# Patient Record
Sex: FEMALE | Race: OTHER | NOT HISPANIC OR LATINO | URBAN - METROPOLITAN AREA
[De-identification: names, ages, dates, MRNs, and addresses within clinical notes are randomized per-mention and may not be internally consistent; named-entity substitution may affect disease eponyms.]

---

## 2017-07-14 ENCOUNTER — EMERGENCY (EMERGENCY)
Facility: HOSPITAL | Age: 27
LOS: 1 days | Discharge: HOME | End: 2017-07-14
Attending: EMERGENCY MEDICINE | Admitting: EMERGENCY MEDICINE
Payer: COMMERCIAL

## 2017-07-14 VITALS
TEMPERATURE: 98 F | DIASTOLIC BLOOD PRESSURE: 73 MMHG | OXYGEN SATURATION: 100 % | HEIGHT: 62 IN | HEART RATE: 77 BPM | RESPIRATION RATE: 15 BRPM | WEIGHT: 149.91 LBS | SYSTOLIC BLOOD PRESSURE: 130 MMHG

## 2017-07-14 DIAGNOSIS — Z98.89 OTHER SPECIFIED POSTPROCEDURAL STATES: Chronic | ICD-10-CM

## 2017-07-14 PROCEDURE — 99053 MED SERV 10PM-8AM 24 HR FAC: CPT

## 2017-07-14 PROCEDURE — 71020: CPT | Mod: 26

## 2017-07-14 PROCEDURE — 99285 EMERGENCY DEPT VISIT HI MDM: CPT | Mod: 25

## 2017-07-14 PROCEDURE — 93010 ELECTROCARDIOGRAM REPORT: CPT

## 2017-07-14 RX ORDER — ACETAMINOPHEN 500 MG
650 TABLET ORAL ONCE
Qty: 0 | Refills: 0 | Status: COMPLETED | OUTPATIENT
Start: 2017-07-14 | End: 2017-07-14

## 2017-07-14 RX ADMIN — Medication 650 MILLIGRAM(S): at 07:53

## 2017-07-14 NOTE — ED ADULT NURSE NOTE - CHIEF COMPLAINT QUOTE
Pt c/o generalized chest pain and tightness [id number]. Denies dizziness, lightheadedness, SOB, n/v or any PMH. Appears comfortable in triage

## 2017-07-14 NOTE — ED ADULT TRIAGE NOTE - CHIEF COMPLAINT QUOTE
Pt c/o generalized chest pain and tightness [id number]. Denies dizziness, lightheadedness, SOB, n/v or any PMH. Appears comfortable in triage

## 2017-07-14 NOTE — ED PROVIDER NOTE - OBJECTIVE STATEMENT
27F no PMH p/w L CP, since 0530, began while crying (was upset with someone), intermittent, non-radiating, no exacerbating factors, improved w/ deep breaths, not similar to prior. Also c/o chronic intermittent occipital pain that occurs while laughing or crying, lasts seconds and then resolves, currently not feeling it.  No associated lightheaded, diaphoresis, SOB, NVD, abd pain, urinary complaints, OCP use, LE pain/swelling, recent travel/immobilization, black/bloody stool. Dad MI 40s, no FMH clots. Not pleuritic.

## 2017-07-14 NOTE — ED PROVIDER NOTE - MEDICAL DECISION MAKING DETAILS
27F no PM p/w intermittent cp for several hrs, no other associated cardiovascular complaints. Vitals and exam wnl. EKG wnl.   ddx: Likely MSK vs. GI. Clinically not ACS, PE, tamponade, perf, myocarditis, dissection.  CXR, symptom control, reassess.   Likely outpt pmd f/u.

## 2017-07-14 NOTE — ED ADULT NURSE NOTE - CHPI ED SYMPTOMS NEG
no back pain/no cough/no vomiting/no nausea/no fever/no chills/no diaphoresis/no syncope/no shortness of breath/no dizziness

## 2017-07-14 NOTE — ED ADULT NURSE NOTE - OBJECTIVE STATEMENT
Pt 27y F presents to ED c/o non-radiating CP that started around 530am after being upset(crying). Pt AAOx3 and ambulatory, No significant PMH. Pt states that pain improved with deep breathes. Pt denies N/V/D, HA, SOB, diaphoresis, lightheadedness, weakness, tingling, dysuria. Will continue to monitor.

## 2017-11-18 ENCOUNTER — EMERGENCY (EMERGENCY)
Facility: HOSPITAL | Age: 27
LOS: 1 days | Discharge: HOME | End: 2017-11-18
Attending: EMERGENCY MEDICINE | Admitting: EMERGENCY MEDICINE
Payer: COMMERCIAL

## 2017-11-18 VITALS
RESPIRATION RATE: 16 BRPM | DIASTOLIC BLOOD PRESSURE: 99 MMHG | HEART RATE: 76 BPM | OXYGEN SATURATION: 99 % | TEMPERATURE: 98 F | SYSTOLIC BLOOD PRESSURE: 122 MMHG

## 2017-11-18 DIAGNOSIS — Z98.89 OTHER SPECIFIED POSTPROCEDURAL STATES: Chronic | ICD-10-CM

## 2017-11-18 LAB
ALBUMIN SERPL ELPH-MCNC: 4.4 G/DL — SIGNIFICANT CHANGE UP (ref 3.3–5)
ALP SERPL-CCNC: 33 U/L — LOW (ref 40–120)
ALT FLD-CCNC: 25 U/L — SIGNIFICANT CHANGE UP (ref 4–33)
APTT BLD: 29.5 SEC — SIGNIFICANT CHANGE UP (ref 27.5–37.4)
AST SERPL-CCNC: 15 U/L — SIGNIFICANT CHANGE UP (ref 4–32)
BASOPHILS # BLD AUTO: 0.08 K/UL — SIGNIFICANT CHANGE UP (ref 0–0.2)
BASOPHILS NFR BLD AUTO: 0.7 % — SIGNIFICANT CHANGE UP (ref 0–2)
BILIRUB SERPL-MCNC: 0.2 MG/DL — SIGNIFICANT CHANGE UP (ref 0.2–1.2)
BUN SERPL-MCNC: 18 MG/DL — SIGNIFICANT CHANGE UP (ref 7–23)
CALCIUM SERPL-MCNC: 8.9 MG/DL — SIGNIFICANT CHANGE UP (ref 8.4–10.5)
CHLORIDE SERPL-SCNC: 101 MMOL/L — SIGNIFICANT CHANGE UP (ref 98–107)
CO2 SERPL-SCNC: 22 MMOL/L — SIGNIFICANT CHANGE UP (ref 22–31)
CREAT SERPL-MCNC: 0.71 MG/DL — SIGNIFICANT CHANGE UP (ref 0.5–1.3)
EOSINOPHIL # BLD AUTO: 0.18 K/UL — SIGNIFICANT CHANGE UP (ref 0–0.5)
EOSINOPHIL NFR BLD AUTO: 1.6 % — SIGNIFICANT CHANGE UP (ref 0–6)
GLUCOSE SERPL-MCNC: 102 MG/DL — HIGH (ref 70–99)
HCG SERPL-ACNC: < 5 MIU/ML — SIGNIFICANT CHANGE UP
HCT VFR BLD CALC: 41.6 % — SIGNIFICANT CHANGE UP (ref 34.5–45)
HGB BLD-MCNC: 14 G/DL — SIGNIFICANT CHANGE UP (ref 11.5–15.5)
IMM GRANULOCYTES # BLD AUTO: 0.05 # — SIGNIFICANT CHANGE UP
IMM GRANULOCYTES NFR BLD AUTO: 0.4 % — SIGNIFICANT CHANGE UP (ref 0–1.5)
INR BLD: 1.02 — SIGNIFICANT CHANGE UP (ref 0.88–1.17)
LYMPHOCYTES # BLD AUTO: 27 % — SIGNIFICANT CHANGE UP (ref 13–44)
LYMPHOCYTES # BLD AUTO: 3.13 K/UL — SIGNIFICANT CHANGE UP (ref 1–3.3)
MCHC RBC-ENTMCNC: 29.7 PG — SIGNIFICANT CHANGE UP (ref 27–34)
MCHC RBC-ENTMCNC: 33.7 % — SIGNIFICANT CHANGE UP (ref 32–36)
MCV RBC AUTO: 88.1 FL — SIGNIFICANT CHANGE UP (ref 80–100)
MONOCYTES # BLD AUTO: 0.57 K/UL — SIGNIFICANT CHANGE UP (ref 0–0.9)
MONOCYTES NFR BLD AUTO: 4.9 % — SIGNIFICANT CHANGE UP (ref 2–14)
NEUTROPHILS # BLD AUTO: 7.59 K/UL — HIGH (ref 1.8–7.4)
NEUTROPHILS NFR BLD AUTO: 65.4 % — SIGNIFICANT CHANGE UP (ref 43–77)
NRBC # FLD: 0 — SIGNIFICANT CHANGE UP
PLATELET # BLD AUTO: 255 K/UL — SIGNIFICANT CHANGE UP (ref 150–400)
PMV BLD: 10.3 FL — SIGNIFICANT CHANGE UP (ref 7–13)
POTASSIUM SERPL-MCNC: 4.1 MMOL/L — SIGNIFICANT CHANGE UP (ref 3.5–5.3)
POTASSIUM SERPL-SCNC: 4.1 MMOL/L — SIGNIFICANT CHANGE UP (ref 3.5–5.3)
PROT SERPL-MCNC: 6.9 G/DL — SIGNIFICANT CHANGE UP (ref 6–8.3)
PROTHROM AB SERPL-ACNC: 11.4 SEC — SIGNIFICANT CHANGE UP (ref 9.8–13.1)
RBC # BLD: 4.72 M/UL — SIGNIFICANT CHANGE UP (ref 3.8–5.2)
RBC # FLD: 12.6 % — SIGNIFICANT CHANGE UP (ref 10.3–14.5)
SODIUM SERPL-SCNC: 140 MMOL/L — SIGNIFICANT CHANGE UP (ref 135–145)
WBC # BLD: 11.6 K/UL — HIGH (ref 3.8–10.5)
WBC # FLD AUTO: 11.6 K/UL — HIGH (ref 3.8–10.5)

## 2017-11-18 PROCEDURE — 71020: CPT | Mod: 26

## 2017-11-18 PROCEDURE — 99285 EMERGENCY DEPT VISIT HI MDM: CPT | Mod: 25

## 2017-11-18 PROCEDURE — 70450 CT HEAD/BRAIN W/O DYE: CPT | Mod: 26

## 2017-11-18 RX ORDER — METOCLOPRAMIDE HCL 10 MG
10 TABLET ORAL ONCE
Qty: 0 | Refills: 0 | Status: COMPLETED | OUTPATIENT
Start: 2017-11-18 | End: 2017-11-18

## 2017-11-18 RX ORDER — SODIUM CHLORIDE 9 MG/ML
1000 INJECTION INTRAMUSCULAR; INTRAVENOUS; SUBCUTANEOUS ONCE
Qty: 0 | Refills: 0 | Status: COMPLETED | OUTPATIENT
Start: 2017-11-18 | End: 2017-11-18

## 2017-11-18 RX ORDER — KETOROLAC TROMETHAMINE 30 MG/ML
15 SYRINGE (ML) INJECTION ONCE
Qty: 0 | Refills: 0 | Status: DISCONTINUED | OUTPATIENT
Start: 2017-11-18 | End: 2017-11-18

## 2017-11-18 RX ADMIN — Medication 15 MILLIGRAM(S): at 07:09

## 2017-11-18 RX ADMIN — Medication 10 MILLIGRAM(S): at 06:21

## 2017-11-18 RX ADMIN — SODIUM CHLORIDE 1000 MILLILITER(S): 9 INJECTION INTRAMUSCULAR; INTRAVENOUS; SUBCUTANEOUS at 05:15

## 2017-11-18 NOTE — ED ADULT NURSE NOTE - OBJECTIVE STATEMENT
pt arrives aaox3 w/ c/o syncopal episode earlier tonight. pt states she passed out not sure where she hit her head but states she woke up to her  trying to wake her. pt c/o severe left sided headache with sensitivity to light. pt states happened before years ago 3 times but never was worked up for it. pt IV accessed 20 gauge Left hand labs sent. waiting further orders will continue to monitor.

## 2017-11-18 NOTE — ED PROVIDER NOTE - PROGRESS NOTE DETAILS
Luis ROGER: Pt offered CDU for tele monitoring and echo, she refuses to stay, stating that she prefers to go home now and will follow-up with her PMD.

## 2017-11-18 NOTE — ED ADULT NURSE NOTE - CHIEF COMPLAINT QUOTE
Patient brought into walk in triage by her , he states that he found her on the floor passed out with incontinence of urine. She states that she has pain on the left side of her head, no laceration noted.  As per her  they were in the kitchen cooking , he went out for a cigarette and came back in less that 3 minutes and she was on the floor.  H/o passing out in the past but is not sure if it was a seizure.

## 2017-11-18 NOTE — ED PROVIDER NOTE - OBJECTIVE STATEMENT
27F no medical hx presents with syncope. Pt endorsed to having 3 episodes of syncope 3  years ago without a further workup. Pt came in today after a  found her on the floor. It was an unwitnessed fall with urinary incontinence. No post ictal state. No tongue injury. Pt denies any chest pain or SOB. Complaining of headache and head pressure since the fall. No FHX of heart disease. No one dies young in the family.

## 2017-11-18 NOTE — ED ADULT TRIAGE NOTE - CHIEF COMPLAINT QUOTE
Patient brought into walk in triage by her , he states that he found her on the floor passed out. She states that she has pain on the left side of her head, no laceration noted.  As per her  they were in the kitchen cooking , he went out for a cigarette and came back in less that 3 minutes and she was on the floor. Patient brought into walk in triage by her , he states that he found her on the floor passed out with incontinence of urine. She states that she has pain on the left side of her head, no laceration noted.  As per her  they were in the kitchen cooking , he went out for a cigarette and came back in less that 3 minutes and she was on the floor.  H/o passing out in the past but is not sure if it was a seizure.

## 2017-11-18 NOTE — ED PROVIDER NOTE - ATTENDING CONTRIBUTION TO CARE
28 y/o F with no PMH here after a syncopal episode.  Pt states she was cooking in her kitchen, started to feel a mild headache about 30 min before.  Pt also reports not eating or drinking during the day as she had been busy running errands.  Pt does not recall anything except for waking up on the floor.  Per , he left the kitchen to have a cigarette for a minute and returned to find the pt on the floor.  (+)urinary incontinence, but no tongue biting, no seizure like activity.  Pt awoke afterwards and immediately back to baseline with no apparent post-ictal period.  She reports having 3 previous similar episodes of syncope in the past, but had never been seen by a doctor any of those times.  Currently pt c/o headache, worse in the back where she struck her head on the floor and mild nausea.  Well appearing, lying comfortably in stretcher, awake and alert, nontoxic.  VSS.  NCAT with small contusion to occiput, neck is supple, PERRL, EOMI, dry mucous membranes.  Lungs cta bl.  Cards nl S1/S2, RRR, no MRG.  Abd soft ntnd.  No pedal edema or calf tenderness.  Extremities intact with no gross deformities, moving all well.  No focal neuro deficits.  Plan for labs, ekg, ct head r/o injury, cxr, poss obs for syncope eval.

## 2018-01-26 ENCOUNTER — EMERGENCY (EMERGENCY)
Facility: HOSPITAL | Age: 28
LOS: 1 days | Discharge: LEFT BEFORE DISCHARGE | End: 2018-01-26
Admitting: EMERGENCY MEDICINE

## 2018-01-26 VITALS
SYSTOLIC BLOOD PRESSURE: 103 MMHG | RESPIRATION RATE: 16 BRPM | OXYGEN SATURATION: 100 % | DIASTOLIC BLOOD PRESSURE: 66 MMHG | TEMPERATURE: 99 F | HEART RATE: 82 BPM

## 2018-01-26 DIAGNOSIS — Z98.89 OTHER SPECIFIED POSTPROCEDURAL STATES: Chronic | ICD-10-CM

## 2018-01-26 NOTE — ED ADULT TRIAGE NOTE - CHIEF COMPLAINT QUOTE
Pt c/o mid sternal CP that radiates to back x 1hr. Also c/o SOB, dizziness, nausea, intermit pain to BUE. States hx of similar eps in past unable to see cardiologist.

## 2018-01-27 VITALS
HEART RATE: 71 BPM | DIASTOLIC BLOOD PRESSURE: 62 MMHG | SYSTOLIC BLOOD PRESSURE: 105 MMHG | RESPIRATION RATE: 16 BRPM | OXYGEN SATURATION: 100 % | TEMPERATURE: 98 F

## 2018-02-05 ENCOUNTER — EMERGENCY (EMERGENCY)
Facility: HOSPITAL | Age: 28
LOS: 1 days | Discharge: HOME | End: 2018-02-05
Attending: EMERGENCY MEDICINE | Admitting: EMERGENCY MEDICINE
Payer: COMMERCIAL

## 2018-02-05 VITALS
TEMPERATURE: 98 F | HEART RATE: 128 BPM | DIASTOLIC BLOOD PRESSURE: 100 MMHG | RESPIRATION RATE: 20 BRPM | OXYGEN SATURATION: 99 % | SYSTOLIC BLOOD PRESSURE: 143 MMHG | WEIGHT: 149.91 LBS | HEIGHT: 62 IN

## 2018-02-05 VITALS
TEMPERATURE: 98 F | SYSTOLIC BLOOD PRESSURE: 119 MMHG | DIASTOLIC BLOOD PRESSURE: 80 MMHG | RESPIRATION RATE: 17 BRPM | OXYGEN SATURATION: 99 % | HEART RATE: 79 BPM

## 2018-02-05 DIAGNOSIS — Z98.89 OTHER SPECIFIED POSTPROCEDURAL STATES: Chronic | ICD-10-CM

## 2018-02-05 LAB
ALBUMIN SERPL ELPH-MCNC: 4.5 G/DL — SIGNIFICANT CHANGE UP (ref 3.3–5)
ALP SERPL-CCNC: 38 U/L — LOW (ref 40–120)
ALT FLD-CCNC: 36 U/L RC — SIGNIFICANT CHANGE UP (ref 10–45)
ANION GAP SERPL CALC-SCNC: 17 MMOL/L — SIGNIFICANT CHANGE UP (ref 5–17)
APPEARANCE UR: CLEAR — SIGNIFICANT CHANGE UP
AST SERPL-CCNC: 31 U/L — SIGNIFICANT CHANGE UP (ref 10–40)
BASOPHILS # BLD AUTO: 0.1 K/UL — SIGNIFICANT CHANGE UP (ref 0–0.2)
BASOPHILS NFR BLD AUTO: 1.1 % — SIGNIFICANT CHANGE UP (ref 0–2)
BILIRUB SERPL-MCNC: 0.1 MG/DL — LOW (ref 0.2–1.2)
BILIRUB UR-MCNC: NEGATIVE — SIGNIFICANT CHANGE UP
BUN SERPL-MCNC: 14 MG/DL — SIGNIFICANT CHANGE UP (ref 7–23)
CALCIUM SERPL-MCNC: 9 MG/DL — SIGNIFICANT CHANGE UP (ref 8.4–10.5)
CHLORIDE SERPL-SCNC: 101 MMOL/L — SIGNIFICANT CHANGE UP (ref 96–108)
CO2 SERPL-SCNC: 20 MMOL/L — LOW (ref 22–31)
COLOR SPEC: YELLOW — SIGNIFICANT CHANGE UP
CREAT SERPL-MCNC: 0.6 MG/DL — SIGNIFICANT CHANGE UP (ref 0.5–1.3)
DIFF PNL FLD: NEGATIVE — SIGNIFICANT CHANGE UP
EOSINOPHIL # BLD AUTO: 0.2 K/UL — SIGNIFICANT CHANGE UP (ref 0–0.5)
EOSINOPHIL NFR BLD AUTO: 1.9 % — SIGNIFICANT CHANGE UP (ref 0–6)
EPI CELLS # UR: SIGNIFICANT CHANGE UP /HPF
GLUCOSE SERPL-MCNC: 117 MG/DL — HIGH (ref 70–99)
GLUCOSE UR QL: NEGATIVE — SIGNIFICANT CHANGE UP
HCG SERPL QL: NEGATIVE — SIGNIFICANT CHANGE UP
HCT VFR BLD CALC: 42.3 % — SIGNIFICANT CHANGE UP (ref 34.5–45)
HGB BLD-MCNC: 15.2 G/DL — SIGNIFICANT CHANGE UP (ref 11.5–15.5)
KETONES UR-MCNC: NEGATIVE — SIGNIFICANT CHANGE UP
LEUKOCYTE ESTERASE UR-ACNC: NEGATIVE — SIGNIFICANT CHANGE UP
LYMPHOCYTES # BLD AUTO: 3.5 K/UL — HIGH (ref 1–3.3)
LYMPHOCYTES # BLD AUTO: 32.1 % — SIGNIFICANT CHANGE UP (ref 13–44)
MCHC RBC-ENTMCNC: 32.4 PG — SIGNIFICANT CHANGE UP (ref 27–34)
MCHC RBC-ENTMCNC: 35.8 GM/DL — SIGNIFICANT CHANGE UP (ref 32–36)
MCV RBC AUTO: 90.4 FL — SIGNIFICANT CHANGE UP (ref 80–100)
MONOCYTES # BLD AUTO: 0.6 K/UL — SIGNIFICANT CHANGE UP (ref 0–0.9)
MONOCYTES NFR BLD AUTO: 5.4 % — SIGNIFICANT CHANGE UP (ref 2–14)
NEUTROPHILS # BLD AUTO: 6.4 K/UL — SIGNIFICANT CHANGE UP (ref 1.8–7.4)
NEUTROPHILS NFR BLD AUTO: 59.5 % — SIGNIFICANT CHANGE UP (ref 43–77)
NITRITE UR-MCNC: NEGATIVE — SIGNIFICANT CHANGE UP
PH UR: 5.5 — SIGNIFICANT CHANGE UP (ref 5–8)
PLATELET # BLD AUTO: 267 K/UL — SIGNIFICANT CHANGE UP (ref 150–400)
POTASSIUM SERPL-MCNC: 4 MMOL/L — SIGNIFICANT CHANGE UP (ref 3.5–5.3)
POTASSIUM SERPL-SCNC: 4 MMOL/L — SIGNIFICANT CHANGE UP (ref 3.5–5.3)
PROT SERPL-MCNC: 7.4 G/DL — SIGNIFICANT CHANGE UP (ref 6–8.3)
PROT UR-MCNC: NEGATIVE — SIGNIFICANT CHANGE UP
RBC # BLD: 4.68 M/UL — SIGNIFICANT CHANGE UP (ref 3.8–5.2)
RBC # FLD: 11.9 % — SIGNIFICANT CHANGE UP (ref 10.3–14.5)
RBC CASTS # UR COMP ASSIST: SIGNIFICANT CHANGE UP /HPF (ref 0–2)
SODIUM SERPL-SCNC: 138 MMOL/L — SIGNIFICANT CHANGE UP (ref 135–145)
SP GR SPEC: 1.02 — SIGNIFICANT CHANGE UP (ref 1.01–1.02)
TROPONIN T SERPL-MCNC: <0.01 NG/ML — SIGNIFICANT CHANGE UP (ref 0–0.06)
TROPONIN T SERPL-MCNC: <0.01 NG/ML — SIGNIFICANT CHANGE UP (ref 0–0.06)
UROBILINOGEN FLD QL: NEGATIVE — SIGNIFICANT CHANGE UP
WBC # BLD: 10.8 K/UL — HIGH (ref 3.8–10.5)
WBC # FLD AUTO: 10.8 K/UL — HIGH (ref 3.8–10.5)
WBC UR QL: SIGNIFICANT CHANGE UP /HPF (ref 0–5)

## 2018-02-05 PROCEDURE — 73130 X-RAY EXAM OF HAND: CPT | Mod: 26,LT

## 2018-02-05 PROCEDURE — 72125 CT NECK SPINE W/O DYE: CPT | Mod: 26

## 2018-02-05 PROCEDURE — 99236 HOSP IP/OBS SAME DATE HI 85: CPT | Mod: 25

## 2018-02-05 PROCEDURE — 70450 CT HEAD/BRAIN W/O DYE: CPT | Mod: 26

## 2018-02-05 PROCEDURE — 71045 X-RAY EXAM CHEST 1 VIEW: CPT | Mod: 26

## 2018-02-05 PROCEDURE — 93010 ELECTROCARDIOGRAM REPORT: CPT

## 2018-02-05 PROCEDURE — 93306 TTE W/DOPPLER COMPLETE: CPT | Mod: 26

## 2018-02-05 RX ORDER — SODIUM CHLORIDE 9 MG/ML
1000 INJECTION INTRAMUSCULAR; INTRAVENOUS; SUBCUTANEOUS
Qty: 0 | Refills: 0 | Status: DISCONTINUED | OUTPATIENT
Start: 2018-02-05 | End: 2018-02-09

## 2018-02-05 RX ORDER — ACETAMINOPHEN 500 MG
1000 TABLET ORAL ONCE
Qty: 0 | Refills: 0 | Status: COMPLETED | OUTPATIENT
Start: 2018-02-05 | End: 2018-02-05

## 2018-02-05 RX ORDER — SODIUM CHLORIDE 9 MG/ML
1000 INJECTION INTRAMUSCULAR; INTRAVENOUS; SUBCUTANEOUS ONCE
Qty: 0 | Refills: 0 | Status: COMPLETED | OUTPATIENT
Start: 2018-02-05 | End: 2018-02-05

## 2018-02-05 RX ADMIN — Medication 1000 MILLIGRAM(S): at 13:00

## 2018-02-05 RX ADMIN — SODIUM CHLORIDE 150 MILLILITER(S): 9 INJECTION INTRAMUSCULAR; INTRAVENOUS; SUBCUTANEOUS at 13:00

## 2018-02-05 RX ADMIN — SODIUM CHLORIDE 2000 MILLILITER(S): 9 INJECTION INTRAMUSCULAR; INTRAVENOUS; SUBCUTANEOUS at 09:21

## 2018-02-05 RX ADMIN — Medication 400 MILLIGRAM(S): at 10:10

## 2018-02-05 NOTE — ED CDU PROVIDER DISPOSITION NOTE - ATTENDING CONTRIBUTION TO CARE
27 year old woman no PMH p/w syncope. States was getting into her car, felt lightheaded and passed out fell backwards and struck back of neck and left hand. Says she also feels intermittent chest pain, states "it feels like anxiety" and that she has had syncope, chest pain in the past but not been worked up for these. Pt placed in cdu for cardiac vs seizure workup. no events -seen by neurology had spot eeg no events witnessed ; however eeg to be read tomorrow. neuro cleared pt to go home and see neurology tomorrow. Pt feeling well. no f/c/cp/sob/ha/dizziness/abd pain n/v. clear lungs rrr abd soft non-tender cn2-12 intact. Will d/c with follow up. Usman Keyes M.D., Attending Physician

## 2018-02-05 NOTE — ED ADULT NURSE NOTE - OBJECTIVE STATEMENT
28 yo female with PMH syncope presents to ED s/p syncope this morning. Patient was getting into her car when she began to feel lightheaded, pt passed out and fell backward striking her left hand and neck against car. Pt also reports intermittent chest pain that she attributes to anxiety. At present, patient is A&Ox4. PERRL 3mmB.  +Cervical spinal midline tenderness, c-collar placed in ED. +Pain to dorsum of left hand. No swelling or bruising. Abdomen is soft, nondistended, nontender to palpation. Skin intact. No rash.  at bedside.

## 2018-02-05 NOTE — ED PROVIDER NOTE - ATTENDING CONTRIBUTION TO CARE
****ATTENDING**** 28yo f hx PCOS, syncope in past presents s/p syncope today. States she was getting into her car when she felt dizzy and passed out with positive trauma to the head and L hand. States she has had similar symptoms in the past wo outpt work up for a cause. Denies chest pain, palp, sob, abd pain or any other injury. No recent illness or travel or calf pain. No family hx CAD, <51yo.  On exam, Patient is awake, alert x 3. GCS15. NCAT, PERRL. C Collar in place, +Posterior midline cervical spine tenderness.  Chest is cleart to auscultation. +S1S2. Abdomen is soft nondistended/nontender +BS. No rebound or guarding.  Pelvis is stable. Full ROM B/L hips. Back non tender midline T/L spine.   L Hand + tender and swelling to 4-5 MCP, + ROM, Cap refill < 2secs, nml sensation.  EKG reviewed. Check Labs, CT/Xray to eval for injury. Admit pt for syncope to CDU w tele monitoring.

## 2018-02-05 NOTE — CONSULT NOTE ADULT - SUBJECTIVE AND OBJECTIVE BOX
Neurology Consult Note    Name  VANIA KEARNEY    HPI: 28 y/o woman w/ no significant PMH c/o episode of LOC today. Pt was getting into her car, felt lightheaded and had chest pain, then fell to the ground. She was likely unconscious < 1 minute, as her  ran out and found her on the ground shortly afterwards, was able to tap her to wake her up. She denies confusion upon waking up. Denies incontinence, shaking activity, stiffness, or tongue biting. She has had multiple similar episodes in the past, including one witnessed by her  where she became stiff, and one in November associated with urinary incontinence. Many times these episodes have been precipitated by chest pain or injury (ie after hitting her elbow). Has never been worked up for this in the past. Father has "seizures" brought on by loud noises/speaking loudly.     Review of Systems:  Constitutional: Denies fatigue, malaise, chills, fever       Eyes, Ears, Mouth, Throat: Denies throat pain, eye discharge, eye pain  Respiratory: Denies cough or wheezing                           Cardiovascular: As above  Gastrointestinal: Denies abdominal pain, diarrhea, constipation, nausea, or vomiting                                   Genitourinary: Denies frequent urination  Musculoskeletal: + pain posterior head and left elbow s/p fall                                   Dermatologic: Denies rash or pruritis  Neurological: as per above                                                                 Psychiatric: Denies anxiety or depression  Endocrine: Denies thyroid dysfunction or diabetes             Hematologic/Lymphatic: Denies enlarged lymph nodes    MEDICATIONS  (STANDING):  sodium chloride 0.9%. 1000 milliLiter(s) (150 mL/Hr) IV Continuous <Continuous>    Allergies  No Known Allergies    PAST MEDICAL & SURGICAL HISTORY:  PCOS (polycystic ovarian syndrome)  H/O bilateral breast reduction surgery    FH: Father has "seizures"    SH: Rare cigarette smoking ("When I'm stressed"), social EtOH use, occasional marijuana use    Objective:   Vital Signs Last 24 Hrs  T(C): 36.8 (05 Feb 2018 12:38), Max: 37.1 (05 Feb 2018 10:48)  T(F): 98.3 (05 Feb 2018 12:38), Max: 98.8 (05 Feb 2018 10:48)  HR: 78 (05 Feb 2018 12:38) (78 - 128)  BP: 106/70 (05 Feb 2018 12:38) (106/70 - 143/100)  BP(mean): --  RR: 18 (05 Feb 2018 12:38) (17 - 20)  SpO2: 98% (05 Feb 2018 12:38) (98% - 99%)      General Adult Exam  GENERAL APPEARANCE: Well developed, well nourished, alert and cooperative, and appears to be in no acute distress.    Neurological Exam:  Mental Status: Orientated to self, date and place.  Attention intact.  No dysarthria, aphasia or neglect.  Knowledge intact.  Registration intact.  Short and long term memory grossly intact.      Cranial Nerves: PERRL, EOMI, VFF, CN V1-3 intact to light touch.  No facial asymmetry. Tongue, uvula and palate midline.  Sternocleidomastoid and Trapezius intact bilaterally.    Motor:   Tone: normal.                  Strength:     [] Upper extremity                           Delt       Bicep    Tricep                                                  R         5/5        5/5        5/5       5/5                                               L          5/5        5/5        5/5       5/5  [] Lower extremity                           HF          KE          KF        DF         PF                                               R        5/5        5/5        5/5       5/5       5/5                                               L         5/5        5/5       5/5       5/5        5/5  Pronator drift: none                 Tremor: No resting, postural or action tremor.  No myoclonus.    Sensation: intact to light touch x 4 extremities, no extinction    Deep Tendon Reflexes: 1+ bilateral biceps, triceps, brachioradialis, knee and ankle    Coord: No ataxia or dysmetria on FTN b/l      Other:                        15.2   10.8  )-----------( 267      ( 05 Feb 2018 08:58 )             42.3     02-05    138  |  101  |  14  ----------------------------<  117<H>  4.0   |  20<L>  |  0.60    Ca    9.0      05 Feb 2018 08:58    TPro  7.4  /  Alb  4.5  /  TBili  0.1<L>  /  DBili  x   /  AST  31  /  ALT  36  /  AlkPhos  38<L>  02-05    LIVER FUNCTIONS - ( 05 Feb 2018 08:58 )  Alb: 4.5 g/dL / Pro: 7.4 g/dL / ALK PHOS: 38 U/L / ALT: 36 U/L RC / AST: 31 U/L / GGT: x          Radiology    < from: CT Head No Cont (02.05.18 @ 09:53) >  FINDINGS:   There is no acute intracranial hemorrhage, extra-axial fluid collection,   hydrocephalus, mass effect or midline shift.    Ventricles and sulci are normal in size for the patient's age. Basal   cisterns are patent.    Polypoid mucosal thickening of the right maxillary sinus and partial   opacification of the left maxillary sinus with air-fluid levels. Mild   thickening of the bilateral ethmoid air cells. The mastoid air cells are   clear. Calvarium is intact.     IMPRESSION:   No acute intracranial hemorrhage, extra-axial fluid collection,   hydrocephalus, mass effect or midline shift.    Partial opacification of the maxillary sinuses and ethmoid air cells with   a left maxillary air-fluid level. Correlation with symptoms of sinusitis   recommended.    < end of copied text >

## 2018-02-05 NOTE — ED CDU PROVIDER INITIAL DAY NOTE - ATTENDING CONTRIBUTION TO CARE
28yo f hx PCOS, syncope in past presents s/p syncope today. States she was getting into her car when she felt dizzy and passed out with positive trauma to the head and L hand. States she has had similar symptoms in the past wo outpt work up for a cause. Denies chest pain, palp, sob, abd pain or any other injury. No recent illness or travel or calf pain. No family hx CAD, <49yo. Pt reports now that past episodes may have had incontinence but no definitive seizures.   On exam, Patient is awake, alert x 3. GCS15. NCAT, PERRL. C Collar in place, +Posterior midline cervical spine tenderness.  Chest is cleart to auscultation. +S1S2. Abdomen is soft nondistended/nontender +BS. No rebound or guarding.  Pelvis is stable. Full ROM B/L hips. Back non tender midline T/L spine.   L Hand + tender and swelling to 4-5 MCP, + ROM, Cap refill < 2secs, nml sensation.  EKG reviewed.  Results reviewed. Admit pt for syncope to CDU w tele monitoring and neurology eval for possible seizure.

## 2018-02-05 NOTE — ED CDU PROVIDER INITIAL DAY NOTE - DETAILS
syncope vs seizure  -TELE  -eeg  -[facility]  -[facility] EVAL  -ECHO  -CASE D/W ATTENDING

## 2018-02-05 NOTE — ED PROVIDER NOTE - MEDICAL DECISION MAKING DETAILS
27F syncope and subsequent left hand and neck injury with midline cervical ttp but no neuro deficit. Will do labs, imaging for traumatic injury, ?CDU for syncope w/u. PERC negative, LIANA head CT negative.

## 2018-02-05 NOTE — ED CDU PROVIDER DISPOSITION NOTE - CLINICAL COURSE
27 year old woman no PMH p/w syncope. States was getting into her car, felt lightheaded and passed out fell backwards and struck back of neck and left hand. Says she also feels intermittent chest pain, states "it feels like anxiety" and that she has had syncope, chest pain in the past but not been worked up for these. Does not follow regularly with a doctor. LOC brief, no seizure activity per , no post-ictal period. Negative imaging for acute fx or ich. in CDU for tele, hydration, echo, neuro consult, and eeg. No tele events while in CDU. NO acute findings on echo. Spouse at bedside and pt does not want to wait for eeg, will follow up outpt with Neuro for further seizure workup. Stable vitals. Ambulatory in the unit.

## 2018-02-05 NOTE — ED PROVIDER NOTE - MUSCULOSKELETAL, MLM
+cervical midline ttp ~C4 without step-off, deformity. Left hand NV intact with pain diffusely to dorsum without snuffbox tenderness or pain on axial load of 1st digit. ROM otherwise intact, extremities otherwise intact. No calf tenderness or LE edema

## 2018-02-05 NOTE — ED PROVIDER NOTE - PROGRESS NOTE DETAILS
c-collar cleared pt reassessed, states she feels slightly better. Xray reviewed w rads neg for fx. Pt states she had an episode of stiffness and incontinence w last syncope. Neurology consulted. Pt admitted to CDU. [id number]

## 2018-02-05 NOTE — ED CDU PROVIDER INITIAL DAY NOTE - PROGRESS NOTE DETAILS
Patient resting in bed comfortably. No distress, no complaints. Vital Signs Stable. No events on telemetry monitor. Awaiting EEG. -Tesfaye Ram PA-C EEG performed. Case discussed w Dr Keyes. - Tesfaye Ram PA-C

## 2018-02-05 NOTE — ED PROVIDER NOTE - OBJECTIVE STATEMENT
27 year old woman no PMH p/w syncope. States was getting into her car, felt lightheaded and passed out fell backwards and struck back of neck and left hand. Says she also feels intermittent chest pain, states "it feels like anxiety" and that she has had syncope, chest pain in the past but not been worked up for these. Does not follow regularly with a doctor. LOC brief, no seizure activity per , no post-ictal period. C/o midline cervical neck pain without associated neuro symptoms, left dorsal hand pain but no tingling/numbness/weakness, no bony deformity. No VTE risk factors. No family hx coagulopathy or early cardiac deaths.

## 2018-02-05 NOTE — ED PROVIDER NOTE - SKIN, MLM
Skin normal color for race, warm, dry and intact. No evidence of rash. No outward signs of traumatic injury.

## 2018-02-05 NOTE — CONSULT NOTE ADULT - ASSESSMENT
28 y/o woman w/ no significant PMH c/o episode of LOC today, with several other episodes in the past. Today's episode precipitated by chest pain and lightheadedness, not a/w tongue biting, incontinence, or confusion afterwards, however has had stiffness/urinary incontinence during some past episodes. Neurologic exam reveals no focal abnormalities. CTH shows no acute pathology. Labs significant for very mild leukocytosis.     Impression: Syncope (vasovagal, cardiac) more likely based on history of today's episode however can not r/o seizure    - Agree with cardiac workup in CDU  - Recommend routine EEG while in CDU to evaluate for seizure focus  - No AEDs at this time given no clear history consistent with seizure at this point  - Would recommend patient not to drive until further evaluation is completed. This was discussed with patient. 28 y/o woman w/ no significant PMH c/o episode of LOC today, with several other episodes in the past. Today's episode precipitated by chest pain and lightheadedness, not a/w tongue biting, incontinence, or confusion afterwards, however has had stiffness/urinary incontinence during some past episodes. Neurologic exam reveals no focal abnormalities. CTH shows no acute pathology. Labs significant for very mild leukocytosis.     Impression: Syncope (vasovagal, cardiac) more likely based on history of today's episode however can not r/o seizure, particularly given description of past episodes and reported family history    - Agree with cardiac workup in CDU  - Recommend routine EEG while in CDU to evaluate for seizure focus  - No AEDs at this time given no clear history consistent with seizure at this point  - Would recommend patient not to drive until further evaluation is completed. This was discussed with patient.   - Will need outpatient follow up [address] [facility] [phone]

## 2018-02-06 PROCEDURE — 70450 CT HEAD/BRAIN W/O DYE: CPT

## 2018-02-06 PROCEDURE — 99284 EMERGENCY DEPT VISIT MOD MDM: CPT | Mod: 25

## 2018-02-06 PROCEDURE — 85027 COMPLETE CBC AUTOMATED: CPT

## 2018-02-06 PROCEDURE — 96374 THER/PROPH/DIAG INJ IV PUSH: CPT | Mod: XU

## 2018-02-06 PROCEDURE — 72125 CT NECK SPINE W/O DYE: CPT

## 2018-02-06 PROCEDURE — 71045 X-RAY EXAM CHEST 1 VIEW: CPT

## 2018-02-06 PROCEDURE — 73130 X-RAY EXAM OF HAND: CPT

## 2018-02-06 PROCEDURE — 80053 COMPREHEN METABOLIC PANEL: CPT

## 2018-02-06 PROCEDURE — 81001 URINALYSIS AUTO W/SCOPE: CPT

## 2018-02-06 PROCEDURE — 95819 EEG AWAKE AND ASLEEP: CPT | Mod: 26

## 2018-02-06 PROCEDURE — 82962 GLUCOSE BLOOD TEST: CPT

## 2018-02-06 PROCEDURE — 84484 ASSAY OF TROPONIN QUANT: CPT

## 2018-02-06 PROCEDURE — 84443 ASSAY THYROID STIM HORMONE: CPT

## 2018-02-06 PROCEDURE — 95819 EEG AWAKE AND ASLEEP: CPT

## 2018-02-06 PROCEDURE — 84703 CHORIONIC GONADOTROPIN ASSAY: CPT

## 2018-02-06 PROCEDURE — 93005 ELECTROCARDIOGRAM TRACING: CPT

## 2018-02-06 PROCEDURE — G0378: CPT

## 2018-02-06 PROCEDURE — 93306 TTE W/DOPPLER COMPLETE: CPT

## 2018-02-06 NOTE — EEG REPORT - NS EEG TEXT BOX
[facility]  Report of Routine EEG with Video    [facility]: [address], [address] [place] [address], Phone: [phone]  [facility]: [address] [place] [address], Phone: [phone]  Office: [address] [facility], [address], [place] [address], Phone: [phone]    Patient Name: Dawn Carvalho     Age: 27 y  : 1990  Patient ID: -, MRN #: MR# [id number], Location: [facility] BED 06  Referring Physician: -    EEG #: [id number]  Study Date: 2018		    Technical Information:					  On Instrument: -  Placement and Labeling of Electrodes:  The EEG was performed utilizing 20 channels referential EEG connections (coronal over temporal over parasagittal montage) using all standard 10-20 electrode placements with EKG.  Recording was at a sampling rate of 256 samples per second per channel.  Time synchronized digital video recording was done simultaneously with EEG recording.  A low light infrared camera was used for low light recording.  Jaren and seizure detection algorithms were utilized.    History:  27yr, no PMH, p/w Syncope, ?post-ictal, syncope vs seizure      Medication	  No Data.	    Study Interpretation:    FINDINGS:  The background was continuous, spontaneously variable and reactive.  During wakefulness, the posteriorly dominant rhythm consisted of symmetric, well-modulated 10 Hz activity, with amplitude to 30 uV, that attenuated to eye opening.  Low amplitude frontal beta was noted in wakefulness.    Background Slowing:  Generalized slowing: none was present.  Focal slowing: none was present.    Sleep Background:  Drowsiness was characterized by fragmentation, attenuation, and slowing of the background activity.    Sleep was characterized by the presence of vertex waves, symmetric spindles, and K-complexes.    Other Paroxysmal Activity:  None     Interictal Epileptiform Activity:   No epileptiform discharges were present.    Ictal Epileptiform Activity or Events:  No clinical events were recorded.  No seizures were recorded.    Activation Procedures:   Hyperventilation was not performed.    Photic stimulation was not performed.    Artifacts:  Intermittent myogenic and movement artifacts were noted.    ECG:  The heart rate on single channel ECG was predominantly between 65-75 BPM.    EEG Classification / Summary:  Normal Abnormal EEG in the awake, drowsy and asleep states.    Clinical Impression:  There are no epileptiform abnormalities recorded.          Evangelina Cordova, DO   Neurophysiology/Epilepsy Fellow, [facility] [place]    Nikolas Johnson M.D.   of Neurology, [facility]  Report of Routine EEG with Video    [facility]: [address], [address] [place] [address], Phone: [phone]  [facility]: [address] [place] [address], Phone: [phone]  Office: 1 [facility], [facility] [address], [place] [address], Phone: [phone]    Patient Name: Dawn Carvalho     Age: 27 y  : 1990  Patient ID: -, MRN #: MR# [id number], Location: [facility] BED 06  Referring Physician: -    EEG #: [id number]  Study Date: 2018		    Technical Information:					  On Instrument: -  Placement and Labeling of Electrodes:  The EEG was performed utilizing 20 channels referential EEG connections (coronal over temporal over parasagittal montage) using all standard 10-20 electrode placements with EKG.  Recording was at a sampling rate of 256 samples per second per channel.  Time synchronized digital video recording was done simultaneously with EEG recording.  A low light infrared camera was used for low light recording.  Jaren and seizure detection algorithms were utilized.    History:  27yr, no PMH, p/w Syncope, ?post-ictal, syncope vs seizure      Medication	  No Data.	    Study Interpretation:    FINDINGS:  The background was continuous, spontaneously variable and reactive.  During wakefulness, the posteriorly dominant rhythm consisted of symmetric, well-modulated 10 Hz activity, with amplitude to 30 uV, that attenuated to eye opening.  Low amplitude frontal beta was noted in wakefulness.    Background Slowing:  Generalized slowing: none was present.  Focal slowing: none was present.    Sleep Background:  Drowsiness was characterized by fragmentation, attenuation, and slowing of the background activity.    Sleep was characterized by the presence of vertex waves, symmetric spindles, and K-complexes.    Other Paroxysmal Activity:  None     Interictal Epileptiform Activity:   No epileptiform discharges were present.    Ictal Epileptiform Activity or Events:  No clinical events were recorded.  No seizures were recorded.    Activation Procedures:   Hyperventilation was not performed.    Photic stimulation was not performed.    Artifacts:  Intermittent myogenic and movement artifacts were noted.    ECG:  The heart rate on single channel ECG was predominantly between 65-75 BPM.    EEG Classification / Summary:  Normal EEG in the awake, drowsy and asleep states.    Clinical Impression:  There are no epileptiform abnormalities recorded.          Evangelina Mikhaylova, DO   Neurophysiology/Epilepsy Fellow, [facility] [place]    Nikolas Johnson M.D.   of Neurology, [facility]

## 2018-04-20 NOTE — ED ADULT NURSE NOTE - GENITOURINARY WDL
Left message to call this nurse in urgent care on Sunday 4-22-18 or call back Monday for luis    Bladder non-tender and non-distended. Urine clear yellow.

## 2018-08-18 ENCOUNTER — INPATIENT (INPATIENT)
Facility: HOSPITAL | Age: 28
LOS: 0 days | Discharge: HOME | End: 2018-08-19
Attending: OBSTETRICS & GYNECOLOGY | Admitting: OBSTETRICS & GYNECOLOGY
Payer: COMMERCIAL

## 2018-08-18 VITALS
SYSTOLIC BLOOD PRESSURE: 100 MMHG | TEMPERATURE: 98 F | RESPIRATION RATE: 18 BRPM | HEART RATE: 80 BPM | OXYGEN SATURATION: 100 % | DIASTOLIC BLOOD PRESSURE: 55 MMHG

## 2018-08-18 DIAGNOSIS — Z98.89 OTHER SPECIFIED POSTPROCEDURAL STATES: Chronic | ICD-10-CM

## 2018-08-18 DIAGNOSIS — N83.519 TORSION OF OVARY AND OVARIAN PEDICLE, UNSPECIFIED SIDE: ICD-10-CM

## 2018-08-18 DIAGNOSIS — O26.899 OTHER SPECIFIED PREGNANCY RELATED CONDITIONS, UNSPECIFIED TRIMESTER: ICD-10-CM

## 2018-08-18 LAB
ALBUMIN SERPL ELPH-MCNC: 3.9 G/DL — SIGNIFICANT CHANGE UP (ref 3.3–5)
ALP SERPL-CCNC: 27 U/L — LOW (ref 40–120)
ALT FLD-CCNC: 15 U/L — SIGNIFICANT CHANGE UP (ref 4–33)
APPEARANCE UR: SIGNIFICANT CHANGE UP
AST SERPL-CCNC: 22 U/L — SIGNIFICANT CHANGE UP (ref 4–32)
BACTERIA # UR AUTO: NEGATIVE — SIGNIFICANT CHANGE UP
BASOPHILS # BLD AUTO: 0.07 K/UL — SIGNIFICANT CHANGE UP (ref 0–0.2)
BASOPHILS NFR BLD AUTO: 0.5 % — SIGNIFICANT CHANGE UP (ref 0–2)
BILIRUB SERPL-MCNC: 0.3 MG/DL — SIGNIFICANT CHANGE UP (ref 0.2–1.2)
BILIRUB UR-MCNC: NEGATIVE — SIGNIFICANT CHANGE UP
BLD GP AB SCN SERPL QL: NEGATIVE — SIGNIFICANT CHANGE UP
BLOOD UR QL VISUAL: NEGATIVE — SIGNIFICANT CHANGE UP
BUN SERPL-MCNC: 10 MG/DL — SIGNIFICANT CHANGE UP (ref 7–23)
CALCIUM SERPL-MCNC: 9.2 MG/DL — SIGNIFICANT CHANGE UP (ref 8.4–10.5)
CHLORIDE SERPL-SCNC: 98 MMOL/L — SIGNIFICANT CHANGE UP (ref 98–107)
CO2 SERPL-SCNC: 15 MMOL/L — LOW (ref 22–31)
COLOR SPEC: SIGNIFICANT CHANGE UP
CREAT SERPL-MCNC: 0.49 MG/DL — LOW (ref 0.5–1.3)
EOSINOPHIL # BLD AUTO: 0.33 K/UL — SIGNIFICANT CHANGE UP (ref 0–0.5)
EOSINOPHIL NFR BLD AUTO: 2.6 % — SIGNIFICANT CHANGE UP (ref 0–6)
GLUCOSE SERPL-MCNC: 104 MG/DL — HIGH (ref 70–99)
GLUCOSE UR-MCNC: NEGATIVE — SIGNIFICANT CHANGE UP
HCG SERPL-ACNC: SIGNIFICANT CHANGE UP MIU/ML
HCT VFR BLD CALC: 41.6 % — SIGNIFICANT CHANGE UP (ref 34.5–45)
HGB BLD-MCNC: 14.3 G/DL — SIGNIFICANT CHANGE UP (ref 11.5–15.5)
IMM GRANULOCYTES # BLD AUTO: 0.1 # — SIGNIFICANT CHANGE UP
IMM GRANULOCYTES NFR BLD AUTO: 0.8 % — SIGNIFICANT CHANGE UP (ref 0–1.5)
KETONES UR-MCNC: NEGATIVE — SIGNIFICANT CHANGE UP
LEUKOCYTE ESTERASE UR-ACNC: SIGNIFICANT CHANGE UP
LYMPHOCYTES # BLD AUTO: 27.2 % — SIGNIFICANT CHANGE UP (ref 13–44)
LYMPHOCYTES # BLD AUTO: 3.46 K/UL — HIGH (ref 1–3.3)
MCHC RBC-ENTMCNC: 30.3 PG — SIGNIFICANT CHANGE UP (ref 27–34)
MCHC RBC-ENTMCNC: 34.4 % — SIGNIFICANT CHANGE UP (ref 32–36)
MCV RBC AUTO: 88.1 FL — SIGNIFICANT CHANGE UP (ref 80–100)
MONOCYTES # BLD AUTO: 0.74 K/UL — SIGNIFICANT CHANGE UP (ref 0–0.9)
MONOCYTES NFR BLD AUTO: 5.8 % — SIGNIFICANT CHANGE UP (ref 2–14)
NEUTROPHILS # BLD AUTO: 8.03 K/UL — HIGH (ref 1.8–7.4)
NEUTROPHILS NFR BLD AUTO: 63.1 % — SIGNIFICANT CHANGE UP (ref 43–77)
NITRITE UR-MCNC: NEGATIVE — SIGNIFICANT CHANGE UP
NRBC # FLD: 0 — SIGNIFICANT CHANGE UP
PH UR: 7.5 — SIGNIFICANT CHANGE UP (ref 5–8)
PLATELET # BLD AUTO: 296 K/UL — SIGNIFICANT CHANGE UP (ref 150–400)
PMV BLD: 10.1 FL — SIGNIFICANT CHANGE UP (ref 7–13)
POTASSIUM SERPL-MCNC: 4.1 MMOL/L — SIGNIFICANT CHANGE UP (ref 3.5–5.3)
POTASSIUM SERPL-SCNC: 4.1 MMOL/L — SIGNIFICANT CHANGE UP (ref 3.5–5.3)
PROT SERPL-MCNC: 6.7 G/DL — SIGNIFICANT CHANGE UP (ref 6–8.3)
PROT UR-MCNC: NEGATIVE — SIGNIFICANT CHANGE UP
RBC # BLD: 4.72 M/UL — SIGNIFICANT CHANGE UP (ref 3.8–5.2)
RBC # FLD: 12.7 % — SIGNIFICANT CHANGE UP (ref 10.3–14.5)
RBC CASTS # UR COMP ASSIST: NEGATIVE — SIGNIFICANT CHANGE UP (ref 0–?)
RH IG SCN BLD-IMP: POSITIVE — SIGNIFICANT CHANGE UP
SODIUM SERPL-SCNC: 135 MMOL/L — SIGNIFICANT CHANGE UP (ref 135–145)
SP GR SPEC: 1.02 — SIGNIFICANT CHANGE UP (ref 1–1.04)
UROBILINOGEN FLD QL: NORMAL — SIGNIFICANT CHANGE UP
WBC # BLD: 12.73 K/UL — HIGH (ref 3.8–10.5)
WBC # FLD AUTO: 12.73 K/UL — HIGH (ref 3.8–10.5)
WBC UR QL: SIGNIFICANT CHANGE UP (ref 0–?)

## 2018-08-18 PROCEDURE — 99221 1ST HOSP IP/OBS SF/LOW 40: CPT | Mod: 57

## 2018-08-18 PROCEDURE — 76830 TRANSVAGINAL US NON-OB: CPT | Mod: 26

## 2018-08-18 RX ORDER — SODIUM CHLORIDE 9 MG/ML
1000 INJECTION INTRAMUSCULAR; INTRAVENOUS; SUBCUTANEOUS ONCE
Qty: 0 | Refills: 0 | Status: COMPLETED | OUTPATIENT
Start: 2018-08-18 | End: 2018-08-18

## 2018-08-18 RX ORDER — ACETAMINOPHEN 500 MG
1000 TABLET ORAL ONCE
Qty: 0 | Refills: 0 | Status: COMPLETED | OUTPATIENT
Start: 2018-08-18 | End: 2018-08-18

## 2018-08-18 RX ORDER — SODIUM CHLORIDE 9 MG/ML
1000 INJECTION, SOLUTION INTRAVENOUS
Qty: 0 | Refills: 0 | Status: DISCONTINUED | OUTPATIENT
Start: 2018-08-18 | End: 2018-08-19

## 2018-08-18 RX ADMIN — SODIUM CHLORIDE 1000 MILLILITER(S): 9 INJECTION INTRAMUSCULAR; INTRAVENOUS; SUBCUTANEOUS at 13:24

## 2018-08-18 RX ADMIN — Medication 400 MILLIGRAM(S): at 13:24

## 2018-08-18 RX ADMIN — SODIUM CHLORIDE 150 MILLILITER(S): 9 INJECTION, SOLUTION INTRAVENOUS at 22:24

## 2018-08-18 NOTE — ED PROVIDER NOTE - PHYSICAL EXAMINATION
GYN: + right adnexal discomfort, no cmt, no bleeding, no discharge. chaperone ED Jennifer Mcmullen GYN: + right adnexal discomfort, full, no cmt, no bleeding, no discharge. NO abdominal tenderness. chaperone ED Jennifer Mcmullen

## 2018-08-18 NOTE — H&P ADULT - PROBLEM SELECTOR PLAN 1
- admit for LSC ovarian cystectomy  -NPO  - type and screen    Hannah Dietz PGY2  seen and examined with Dr Gomez and Branden - admit for LSC ovarian cystectomy, possible ex-lap  - pt consented at bedside, explained risk and benefits of surgery, with consideration that she is 9w pregnant  -NPO  -type and screen    Hannah Dietz PGY2  seen and examined with Dr Fagan and Branden

## 2018-08-18 NOTE — H&P ADULT - NSHPPHYSICALEXAM_GEN_ALL_CORE
Vital Signs Last 24 Hrs  T(C): 36.8 (18 Aug 2018 13:26), Max: 36.9 (18 Aug 2018 12:36)  T(F): 98.2 (18 Aug 2018 13:26), Max: 98.5 (18 Aug 2018 12:36)  HR: 83 (18 Aug 2018 13:26) (80 - 83)  BP: 125/70 (18 Aug 2018 13:26) (100/55 - 125/70)  BP(mean): --  RR: 18 (18 Aug 2018 13:26) (18 - 18)  SpO2: 100% (18 Aug 2018 13:26) (100% - 100%)    Physical Exam:   General: sitting comfortably in bed, NAD   Abd: Soft, RLQ tender, non-distended.  No rebound or guarding.  :  No bleeding on pad.    External labia wnl.  Bimanual exam with no cervical motion tenderness, uterus wnl, R adnexa full. Tenderness to palpation of R adnexa. no tenderness on L adnexa.

## 2018-08-18 NOTE — H&P ADULT - HISTORY OF PRESENT ILLNESS
HPI: 27yo  @ 9+3w GA (ÁNGEL 3/20/18) presents due to severe abdominal pain radiating to R flank and R leg pain radiating down anterior R leg.  Pt states that the abd pain began this AM.  States she is having difficulty walking due to R leg numbness. Describes abd pain and pulling and is a 5/10.  Received IV tylenol in ED- minimally alleviated pain.     Name of GYN Physician: Dr. Dickinson    POB:  , current pregnancy     Pgyn: R ov cyst known 2cm , followed to be 6cm in  and stable at 6cm .  PCOS  PSH: breast reduction, L wrist sx  Home meds: none    Social History:  Denies smoking use, drug use, alcohol use. HPI: 27yo  @ 9+3w GA (ÁNGEL 3/20/18) presents due to severe abdominal pain radiating to R flank and R leg pain radiating down anterior R leg.  Pt states that the abd pain began this AM.  States she is having difficulty walking due to R leg numbness. Describes abd pain and pulling and is a 5/10.  Received IV tylenol in ED- minimally alleviated pain.  Pt reports mild nausea this morning but denies vomiting. Denies CP/SOB.      Name of GYN Physician: Dr. Dickinson    POB:  , current pregnancy     Pgyn: R ov cyst known 2cm , followed to be 6cm in  and stable at 6cm .  PCOS  PSH: breast reduction, L wrist sx  Home meds: none    Social History:  Denies smoking use, drug use, alcohol use.  Lives w

## 2018-08-18 NOTE — H&P ADULT - NSHPLABSRESULTS_GEN_ALL_CORE
14.3   12.73 )-----------( 296      ( 18 Aug 2018 13:17 )             41.6   08-18    135  |  98  |  10  ----------------------------<  104<H>  4.1   |  15<L>  |  0.49<L>    Ca    9.2      18 Aug 2018 13:17    TPro  6.7  /  Alb  3.9  /  TBili  0.3  /  DBili  x   /  AST  22  /  ALT  15  /  AlkPhos  27<L>  08-18

## 2018-08-18 NOTE — CONSULT NOTE ADULT - SUBJECTIVE AND OBJECTIVE BOX
VANIA KEARNEY  28y  Female [id number]    HPI: 29yo  @ 9+3w GA (ÁNGEL 3/20/18) presents due to severe abdominal pain radiating to R flank and R leg pain radiating down anterior R leg.  Pt states that the abd pain began this AM.  States she is having difficulty walking due to R leg numbness.    Name of GYN Physician: Dr. Dickinson    POB:  , current pregnancy     Pgyn: R ov cyst known 2cm , followed to be 6cm in  and stable at 6cm .  PCOS    Home meds: none    Social History:  Denies smoking use, drug use, alcohol use.      Vital Signs Last 24 Hrs  T(C): 36.8 (18 Aug 2018 13:26), Max: 36.9 (18 Aug 2018 12:36)  T(F): 98.2 (18 Aug 2018 13:26), Max: 98.5 (18 Aug 2018 12:36)  HR: 83 (18 Aug 2018 13:26) (80 - 83)  BP: 125/70 (18 Aug 2018 13:26) (100/55 - 125/70)  BP(mean): --  RR: 18 (18 Aug 2018 13:26) (18 - 18)  SpO2: 100% (18 Aug 2018 13:26) (100% - 100%)    Physical Exam:   General: sitting comfortably in bed, NAD   Abd: Soft, RLQ tender, non-distended.  No rebound or guarding.  :  No bleeding on pad.    External labia wnl.  Bimanual exam with no cervical motion tenderness, uterus wnl, R adnexa full. Tenderness to palpation of R adnexa. no tenderness on L adnexa.  Speculum Exam: No active bleeding from os.  Physiologic discharge.    Ext: non-tender b/l, no edema     LABS:                            14.3   12.73 )-----------( 296      ( 18 Aug 2018 13:17 )             41.6         135  |  98  |  10  ----------------------------<  104<H>  4.1   |  15<L>  |  0.49<L>    Ca    9.2      18 Aug 2018 13:17    TPro  6.7  /  Alb  3.9  /  TBili  0.3  /  DBili  x   /  AST  22  /  ALT  15  /  AlkPhos  27<L>  08-18    I&O's Detail          RADIOLOGY & ADDITIONAL STUDIES:  TVUS: VANIA KEARNEY  28y  Female [id number]    HPI: 27yo  @ 9+3w GA (ÁNGEL 3/20/18) presents due to severe abdominal pain radiating to R flank and R leg pain radiating down anterior R leg.  Pt states that the abd pain began this AM.  States she is having difficulty walking due to R leg numbness.    Name of GYN Physician: Dr. Dickinson    POB:  , current pregnancy     Pgyn: R ov cyst known 2cm , followed to be 6cm in  and stable at 6cm .  PCOS    Home meds: none    Social History:  Denies smoking use, drug use, alcohol use.      Vital Signs Last 24 Hrs  T(C): 36.8 (18 Aug 2018 13:26), Max: 36.9 (18 Aug 2018 12:36)  T(F): 98.2 (18 Aug 2018 13:26), Max: 98.5 (18 Aug 2018 12:36)  HR: 83 (18 Aug 2018 13:26) (80 - 83)  BP: 125/70 (18 Aug 2018 13:26) (100/55 - 125/70)  BP(mean): --  RR: 18 (18 Aug 2018 13:26) (18 - 18)  SpO2: 100% (18 Aug 2018 13:26) (100% - 100%)    Physical Exam:   General: sitting comfortably in bed, NAD   Abd: Soft, RLQ tender, non-distended.  No rebound or guarding.  :  No bleeding on pad.    External labia wnl.  Bimanual exam with no cervical motion tenderness, uterus wnl, R adnexa full. Tenderness to palpation of R adnexa. no tenderness on L adnexa.  Speculum Exam: No active bleeding from os.  Physiologic discharge.    Ext: non-tender b/l, no edema     LABS:                            14.3   12.73 )-----------( 296      ( 18 Aug 2018 13:17 )             41.6         135  |  98  |  10  ----------------------------<  104<H>  4.1   |  15<L>  |  0.49<L>    Ca    9.2      18 Aug 2018 13:17    TPro  6.7  /  Alb  3.9  /  TBili  0.3  /  DBili  x   /  AST  22  /  ALT  15  /  AlkPhos  27<L>  08-18    I&O's Detail          RADIOLOGY & ADDITIONAL STUDIES:  TVUS: no evidence of torsion.  pregnancy in JULIEN- should be f/u outpt

## 2018-08-18 NOTE — ED PROVIDER NOTE - ATTENDING CONTRIBUTION TO CARE
sonia: I, Wilbur Gong MD, personally saw the patient with ACP.  I have personally performed a face to face diagnostic evaluation on this patient.  I have reviewed the ACP note and agree with the history, exam, and plan of care, except as noted.    27 yo F  currently 9w3d here for right sided pelvic pain x today. Pt w/ sudden onset RLQ pain, severe, not related to position/exertion.  Associated w/ nausea to baseline since pregnancy.  Recent US showing 6mm R ovarian cyst.    ***GEN - NAD; well appearing; A+O x3 ***HEAD - NC/AT   ***PULMONARY - CTA b/l, symmetric breath sounds. ***CARDIAC -s1s2, RRR, no M,G,R  ***ABDOMEN - ND, soft, no guarding, no rebound, no masses - RLQ TTP   ***SKIN - no rash or bruising   ***NEUROLOGIC - alert and oriented, follows commands, sensation nl, motor nl, ***PSYCH - insight and judgment nl, memory nl, affect nl, thought nl    I was bought in to case prior to OR - Per PA exam, R adnexal TTP > RLQ - OB/GYN in ROOM, taking pt to OR concern for torsion - if concern for appendicitis persists - can visualize direct in OR, greater concern for torsion.

## 2018-08-18 NOTE — H&P ADULT - ATTENDING COMMENTS
Attending  I agree with Dr. Dietz's assessment and plan.  Pt was also seen with KALIE Stafford during this evaluation.  This 27 y/o  with a live 9 week IUP on u/s presented today to the ED with severe RLQ pain which started suddenly when she was inactive, radiating to her back and leg.  She denied bleeding and cramping.  U/s confirmed live IUP with 6 cm simple Right ovarian cyst noted, with +doppler flow.  Pt had known right ovarian cyst per her history, which is larger on today's u/s.  On my evaluation, she reported her pain was present but had improved to 5/10.  She had only taken Tylenol prior to our exam.  She had exquisite RLQ and suprapubic tenderness to palpation on my exam.  Per Dr. Dietz, +ttp noted on palpation of the right adnexa.      Findings reviewed with the patient.  Her symptoms are concerning for right adnexal torsion due to her ovarian cyst, which could be intermittent, but given her continued pain, needs to be addressed surgically tonight to prevent loss of her right adnexa due to necrosis from the torsion.  We recommended proceeding with diagnostic laparoscopy and right ovarian cystectomy, with possible RSO if that adnexa was noted to be necrotic.  She understands that right ovarian cyst would be removed even if no torsion noted, and that she would likely need to take oral progesterone post surgery as she is just under ten weeks pregnant.  She also understands that as she is at risk for present or future loss of the adnexa without surgery, this procedure is recommended even though she is in the first trimester of the pregnancy.  Risks of the procedure were reviewed, including bleeding, infection, damage to internal organs, although rare; pregnancy loss related to the surgery would be rare.  She voiced understanding of the above and signed informed consent for diagnostic laparoscopy, right ovarian cystectomy, possible RSO, and possible laparotomy (if procedure could not be accomplished laparoscopically).  OR was notified at 6:15pm and I was told a room would be available in an hour.      Patient endorsed to Dr. Radha Yan, GYN attending, who was en route to the hospital to perform the surgery.    Brian ROGER

## 2018-08-18 NOTE — ED PROVIDER NOTE - GASTROINTESTINAL, MLM
Abdomen soft, non-tender, no guarding. + right adnexal ttp Abdomen soft, non-tender, no guarding. + right adnexal ttp, no RLQ abdominal tenderness however exquisite tenderness to adnexa.

## 2018-08-18 NOTE — ED PROVIDER NOTE - OBJECTIVE STATEMENT
27 yo F  currently 9w3d here for right sided pelvic pain x today. pt reports that today she had sudden onset right lower pelvic pain. Reports no meds taken. no associated sxs. Reports had sono done with OBGYN which showed a cyst on the right ovary and confirmed IUP. denies fever chills vomiting diarrhea cp sob weakness HA dizziness vaginal bleeding vaginal discharge.

## 2018-08-18 NOTE — ED ADULT TRIAGE NOTE - CHIEF COMPLAINT QUOTE
9 weeks pregnant c/o sudden onset right pelvic pain denies vaginal bleeding. Has been told she has an oviarn cyst.

## 2018-08-18 NOTE — ED PROVIDER NOTE - PROGRESS NOTE DETAILS
RUPERT [place]: pt doing well, pain improved however reports still intermittently uncomfortable. R ovarian cyst on sono, likely causing pain, GYN consulted. pt GYN @ [facility] RUPERT [place]: GYN saw patient at bedside, plan for OR given cyst and tenderness. RUPERT [place]: GYN saw patient at bedside, plan for OR given cyst and tenderness and concern for torsion. Adnexal tenderness more exquisite than RLQ abdominal tenderness and due to time sen sative nature of torsion will NPO for OR for exploration of ovary and cyst. RUPERT [place]: GYN saw patient at bedside, plan for OR given cyst and tenderness and concern for torsion. Adnexal tenderness more exquisite than RLQ abdominal tenderness and due to time sen sative nature of torsion will NPO for OR for exploration of ovary and cyst, OBGYN will look at appendix while in OR and consult gen surg if abnormalities.

## 2018-08-18 NOTE — ED ADULT NURSE REASSESSMENT NOTE - NS ED NURSE REASSESS COMMENT FT1
Pt co rlq pain denies vaginal bleeding iv placed labs drawn vs wnl pt is afebrile fluids  infusing , pt awaiting u/s

## 2018-08-18 NOTE — ED PROVIDER NOTE - MEDICAL DECISION MAKING DETAILS
29 yo F 9w3d  confirmed IUP here for right pelvic pain. known cyst to that area. otherwise without complaints. will obtain labs, urine, TVUS, tylenol for pain 27 yo F 9w3d  confirmed IUP here for right pelvic pain. known cyst to that area. otherwise without complaints. likely adnexal in nature related to torsion vs ectopic (however unlikely given confirmed IUP in the past few weeks). Pt has known cyst to area, and have right adnexal tenderness and fullness on exam. Compared to the adnexal tenderness there is no RLQ abdominal tenderness on my exam. will obtain labs, urine, TVUS, tylenol for pain

## 2018-08-19 ENCOUNTER — TRANSCRIPTION ENCOUNTER (OUTPATIENT)
Age: 28
End: 2018-08-19

## 2018-08-19 VITALS
DIASTOLIC BLOOD PRESSURE: 59 MMHG | HEART RATE: 89 BPM | SYSTOLIC BLOOD PRESSURE: 99 MMHG | TEMPERATURE: 99 F | RESPIRATION RATE: 17 BRPM | OXYGEN SATURATION: 100 %

## 2018-08-19 PROCEDURE — 99238 HOSP IP/OBS DSCHRG MGMT 30/<: CPT

## 2018-08-19 RX ADMIN — SODIUM CHLORIDE 150 MILLILITER(S): 9 INJECTION, SOLUTION INTRAVENOUS at 04:30

## 2018-08-19 NOTE — DISCHARGE NOTE ADULT - HOSPITAL COURSE
a/w suspected ovarian torsion @ 9w+3d GA, admitted for observation of pain.  Pt was counseled by Gyn and anesthesia and decided against OR due to possible risks of anesthesia during first trimester.  Pt was observed overnight and did not endorse similar abdominal pain.  Was advanced to regular diet in the morning. a/w suspected ovarian torsion @ 9w+3d GA, admitted for observation of pain.  Pt was counseled by Gyn and anesthesia and decided against OR due to possible risks of anesthesia during first trimester.  Pt was observed overnight and did not endorse similar abdominal pain.  Was advanced to regular diet in the morning.  Tolerating regular diet.      Pt able to be d/c home will f/u with Dr Elder this week.

## 2018-08-19 NOTE — DISCHARGE NOTE ADULT - PLAN OF CARE
recovery follow up with OB as soon as possible to establish care.  If you experience similar severe abdominal pain that is pulling or intermittent in nature return to emergency room.

## 2018-08-19 NOTE — PROGRESS NOTE ADULT - ATTENDING COMMENTS
saw and examined patient  No complaints of pain. Tolerating Po  for discharge today with f/u in my office this week

## 2018-08-19 NOTE — DISCHARGE NOTE ADULT - CARE PLAN
Principal Discharge DX:	Pelvic pain affecting pregnancy in first trimester, antepartum  Goal:	recovery  Assessment and plan of treatment:	follow up with OB as soon as possible to establish care.  If you experience similar severe abdominal pain that is pulling or intermittent in nature return to emergency room.

## 2018-08-19 NOTE — PROGRESS NOTE ADULT - SUBJECTIVE AND OBJECTIVE BOX
Pt sleeping.  Complaint of right sided pain but significantly decreased.    abd -- soft, ND. Minimal right sided pain to palpation.            No rebound or gaurding    No CVAT.  A/P stable no evidence of torsion @ this time.        Cont NPO. serial exams.

## 2018-08-19 NOTE — DISCHARGE NOTE ADULT - PATIENT PORTAL LINK FT
You can access the [facility], offered by [facility], by registering with the following website: [email or web address]

## 2018-08-19 NOTE — DISCHARGE NOTE ADULT - CARE PROVIDER_API CALL
[facility] ambualtory/outpatient OB clinic,   [facility] ambualtory/outpatient OB clinic  [facility]- 3rd floor oncology building  Phone: [phone]  Fax: (   )    - [facility] ambualtory/outpatient OB clinic,   [facility] ambualtory/outpatient OB clinic  [facility]- 3rd floor oncology building  Phone: [phone]  Fax: (   )    -    Radha Elder (MD), Obstetrics and Gynecology  [address] [place] [address]  Phone: [phone]  Fax: [phone]

## 2018-08-19 NOTE — PROGRESS NOTE ADULT - PROBLEM SELECTOR PLAN 1
Neuro: not currently on pain medications; pain improved overnight  CV: hemodynamically stable  Pulm: saturating well on room air  GI: NPO; consider transition to regular; serial abdominal exams  : voiding spontaneously  Heme: SCDs for DVT ppx while in bed  Dispo: consider discharge home    Mahi Romero MD PGY1    Mahi Romero MD PGY2

## 2018-08-19 NOTE — DISCHARGE NOTE ADULT - PROVIDER TOKENS
FREE:[LAST[facility]PHONE:[phone]],FAX:[(   )    -],ADDRESS:[[facility]  [facility] 3rd floor oncology [facility]]] FREE:[LAST:[[facility] ambualtory/outpatient OB clinic],PHONE:[phone]],FAX:[(   )    -],ADDRESS:[[facility] ambualtory/outpatient OB clinic  [facility]- 3rd floor oncology [facility]]],TOKEN:'[id number]'

## 2018-08-19 NOTE — PROGRESS NOTE ADULT - SUBJECTIVE AND OBJECTIVE BOX
Patient seen and examined at bedside, no acute overnight events. No acute complaints, pain well controlled. Pt states pain has improved greatly since yesterday. Patient is ambulating, passing flatus, voiding spontaneously. NPO. Denies CP, SOB, N/V, fevers, chills, or any other concerns.    Vital Signs Last 24 Hours  T(C): 36.8 (08-19-18 @ 05:21), Max: 36.9 (08-18-18 @ 12:36)  HR: 67 (08-19-18 @ 05:21) (67 - 84)  BP: 96/55 (08-19-18 @ 05:21) (96/55 - 126/76)  RR: 16 (08-19-18 @ 05:21) (16 - 18)  SpO2: 100% (08-19-18 @ 05:21) (100% - 100%)    I&O's Summary      Physical Exam:  General: NAD  CV: NR, RR, S1, S2, no M/R/G  Lungs: CTAB  Abdomen: soft, mildly tender and improved since last night, non-distended, +BS  Ext: no pain or swelling    Labs:             14.3   12.73<H> )-----------( 296      ( 08-18 @ 13:17 )             41.6         MEDICATIONS  (STANDING):  lactated ringers. 1000 milliLiter(s) (150 mL/Hr) IV Continuous <Continuous>    MEDICATIONS  (PRN):

## 2018-08-19 NOTE — DISCHARGE NOTE ADULT - INSTRUCTIONS
call md for sign of infection (temp greater than 101f, pain not relieved by meds). drink 9-13 eight oz. glasses of fluid daily. call md for follow up appt. regular

## 2018-08-19 NOTE — DISCHARGE NOTE ADULT - MEDICATION SUMMARY - MEDICATIONS TO TAKE
I will START or STAY ON the medications listed below when I get home from the hospital:    metFORMIN  --  by mouth   -- Indication: For home

## 2018-08-20 ENCOUNTER — APPOINTMENT (OUTPATIENT)
Dept: OBGYN | Facility: CLINIC | Age: 28
End: 2018-08-20
Payer: MEDICARE

## 2018-08-20 ENCOUNTER — ASOB RESULT (OUTPATIENT)
Age: 28
End: 2018-08-20

## 2018-08-20 ENCOUNTER — APPOINTMENT (OUTPATIENT)
Dept: OBGYN | Facility: CLINIC | Age: 28
End: 2018-08-20

## 2018-08-20 ENCOUNTER — NON-APPOINTMENT (OUTPATIENT)
Age: 28
End: 2018-08-20

## 2018-08-20 VITALS
HEIGHT: 62 IN | DIASTOLIC BLOOD PRESSURE: 74 MMHG | HEART RATE: 68 BPM | BODY MASS INDEX: 29.36 KG/M2 | WEIGHT: 159.56 LBS | SYSTOLIC BLOOD PRESSURE: 106 MMHG

## 2018-08-20 DIAGNOSIS — Z82.49 FAMILY HISTORY OF ISCHEMIC HEART DISEASE AND OTHER DISEASES OF THE CIRCULATORY SYSTEM: ICD-10-CM

## 2018-08-20 DIAGNOSIS — Z78.9 OTHER SPECIFIED HEALTH STATUS: ICD-10-CM

## 2018-08-20 DIAGNOSIS — Z83.3 FAMILY HISTORY OF DIABETES MELLITUS: ICD-10-CM

## 2018-08-20 DIAGNOSIS — Z80.6 FAMILY HISTORY OF LEUKEMIA: ICD-10-CM

## 2018-08-20 DIAGNOSIS — N83.201 UNSPECIFIED OVARIAN CYST, RIGHT SIDE: ICD-10-CM

## 2018-08-20 LAB
ABO + RH PNL BLD: NORMAL
BASOPHILS # BLD AUTO: 0.04 K/UL
BASOPHILS NFR BLD AUTO: 0.4 %
BLD GP AB SCN SERPL QL: NORMAL
EOSINOPHIL # BLD AUTO: 0.42 K/UL
EOSINOPHIL NFR BLD AUTO: 3.9 %
HCT VFR BLD CALC: 41.5 %
HGB BLD-MCNC: 13.9 G/DL
IMM GRANULOCYTES NFR BLD AUTO: 0.4 %
LYMPHOCYTES # BLD AUTO: 2.98 K/UL
LYMPHOCYTES NFR BLD AUTO: 27.4 %
MAN DIFF?: NORMAL
MCHC RBC-ENTMCNC: 30.1 PG
MCHC RBC-ENTMCNC: 33.5 GM/DL
MCV RBC AUTO: 89.8 FL
MONOCYTES # BLD AUTO: 0.52 K/UL
MONOCYTES NFR BLD AUTO: 4.8 %
NEUTROPHILS # BLD AUTO: 6.89 K/UL
NEUTROPHILS NFR BLD AUTO: 63.1 %
PLATELET # BLD AUTO: 281 K/UL
RBC # BLD: 4.62 M/UL
RBC # FLD: 13.3 %
WBC # FLD AUTO: 10.89 K/UL

## 2018-08-20 PROCEDURE — 0501F PRENATAL FLOW SHEET: CPT

## 2018-08-20 PROCEDURE — 76817 TRANSVAGINAL US OBSTETRIC: CPT

## 2018-08-22 LAB
B19V IGG SER QL IA: 0.5 INDEX
B19V IGG+IGM SER-IMP: NEGATIVE
B19V IGG+IGM SER-IMP: NORMAL
B19V IGM FLD-ACNC: 0.2 INDEX
B19V IGM SER-ACNC: NEGATIVE
C TRACH RRNA SPEC QL NAA+PROBE: NOT DETECTED
HBV SURFACE AG SER QL: NONREACTIVE
HCV AB SER QL: NONREACTIVE
HCV S/CO RATIO: 0.08 S/CO
HIV1+2 AB SPEC QL IA.RAPID: NONREACTIVE
N GONORRHOEA RRNA SPEC QL NAA+PROBE: NOT DETECTED
RUBV IGG FLD-ACNC: 0.9 INDEX
RUBV IGG SER-IMP: NORMAL
SOURCE AMPLIFICATION: NORMAL
T GONDII AB SER-IMP: NEGATIVE
T GONDII AB SER-IMP: NEGATIVE
T GONDII IGG SER QL: <3 IU/ML
T GONDII IGM SER QL: <3 AU/ML
T PALLIDUM AB SER QL IA: NEGATIVE
VZV AB TITR SER: POSITIVE
VZV IGG SER IF-ACNC: 851.1 INDEX

## 2018-09-06 ENCOUNTER — RESULT REVIEW (OUTPATIENT)
Age: 28
End: 2018-09-06

## 2018-09-06 ENCOUNTER — RECORD ABSTRACTING (OUTPATIENT)
Age: 28
End: 2018-09-06

## 2018-09-06 LAB
AR GENE MUT ANL BLD/T: NEGATIVE
BACTERIA UR CULT: ABNORMAL
CFTR MUT TESTED BLD/T: NEGATIVE
FMR1 GENE MUT ANL BLD/T: NORMAL
HGB A MFR BLD: 97.1 %
HGB A2 MFR BLD: 2.9 %
HGB FRACT BLD-IMP: NORMAL
LEAD BLD-MCNC: <1 UG/DL

## 2018-09-11 ENCOUNTER — OTHER (OUTPATIENT)
Age: 28
End: 2018-09-11

## 2018-09-13 ENCOUNTER — APPOINTMENT (OUTPATIENT)
Dept: OBGYN | Facility: CLINIC | Age: 28
End: 2018-09-13

## 2018-09-13 VITALS
SYSTOLIC BLOOD PRESSURE: 113 MMHG | BODY MASS INDEX: 29.81 KG/M2 | HEIGHT: 62 IN | DIASTOLIC BLOOD PRESSURE: 78 MMHG | WEIGHT: 162 LBS

## 2018-09-17 ENCOUNTER — ASOB RESULT (OUTPATIENT)
Age: 28
End: 2018-09-17

## 2018-09-17 ENCOUNTER — APPOINTMENT (OUTPATIENT)
Dept: ANTEPARTUM | Facility: CLINIC | Age: 28
End: 2018-09-17
Payer: MEDICARE

## 2018-09-17 PROCEDURE — 76813 OB US NUCHAL MEAS 1 GEST: CPT

## 2018-09-17 PROCEDURE — 76801 OB US < 14 WKS SINGLE FETUS: CPT

## 2018-09-17 PROCEDURE — 36416 COLLJ CAPILLARY BLOOD SPEC: CPT

## 2018-10-09 ENCOUNTER — OTHER (OUTPATIENT)
Age: 28
End: 2018-10-09

## 2018-10-15 ENCOUNTER — LABORATORY RESULT (OUTPATIENT)
Age: 28
End: 2018-10-15

## 2018-10-16 ENCOUNTER — NON-APPOINTMENT (OUTPATIENT)
Age: 28
End: 2018-10-16

## 2018-10-16 ENCOUNTER — APPOINTMENT (OUTPATIENT)
Dept: OBGYN | Facility: CLINIC | Age: 28
End: 2018-10-16
Payer: MEDICARE

## 2018-10-16 VITALS
HEIGHT: 62 IN | SYSTOLIC BLOOD PRESSURE: 115 MMHG | WEIGHT: 174 LBS | DIASTOLIC BLOOD PRESSURE: 79 MMHG | BODY MASS INDEX: 32.02 KG/M2

## 2018-10-16 PROCEDURE — 0502F SUBSEQUENT PRENATAL CARE: CPT

## 2018-10-19 ENCOUNTER — RESULT REVIEW (OUTPATIENT)
Age: 28
End: 2018-10-19

## 2018-10-19 LAB — BACTERIA UR CULT: ABNORMAL

## 2018-10-23 ENCOUNTER — NON-APPOINTMENT (OUTPATIENT)
Age: 28
End: 2018-10-23

## 2018-10-23 ENCOUNTER — APPOINTMENT (OUTPATIENT)
Dept: OBGYN | Facility: CLINIC | Age: 28
End: 2018-10-23
Payer: COMMERCIAL

## 2018-10-23 VITALS
WEIGHT: 169 LBS | BODY MASS INDEX: 31.1 KG/M2 | SYSTOLIC BLOOD PRESSURE: 110 MMHG | HEIGHT: 62 IN | DIASTOLIC BLOOD PRESSURE: 73 MMHG

## 2018-10-23 PROCEDURE — 99213 OFFICE O/P EST LOW 20 MIN: CPT | Mod: 24,25

## 2018-10-23 PROCEDURE — 0502F SUBSEQUENT PRENATAL CARE: CPT

## 2018-10-25 ENCOUNTER — OTHER (OUTPATIENT)
Age: 28
End: 2018-10-25

## 2018-10-25 LAB
CANDIDA VAG CYTO: NOT DETECTED
G VAGINALIS+PREV SP MTYP VAG QL MICRO: NOT DETECTED
T VAGINALIS VAG QL WET PREP: NOT DETECTED

## 2018-10-26 ENCOUNTER — ASOB RESULT (OUTPATIENT)
Age: 28
End: 2018-10-26

## 2018-10-26 ENCOUNTER — APPOINTMENT (OUTPATIENT)
Dept: ANTEPARTUM | Facility: CLINIC | Age: 28
End: 2018-10-26
Payer: MEDICARE

## 2018-10-26 PROCEDURE — 76811 OB US DETAILED SNGL FETUS: CPT

## 2018-10-26 PROCEDURE — 99213 OFFICE O/P EST LOW 20 MIN: CPT | Mod: 25

## 2018-11-07 ENCOUNTER — CLINICAL ADVICE (OUTPATIENT)
Age: 28
End: 2018-11-07

## 2018-11-11 ENCOUNTER — EMERGENCY (EMERGENCY)
Facility: HOSPITAL | Age: 28
LOS: 1 days | Discharge: HOME | End: 2018-11-11
Attending: EMERGENCY MEDICINE
Payer: COMMERCIAL

## 2018-11-11 VITALS
DIASTOLIC BLOOD PRESSURE: 77 MMHG | HEIGHT: 62 IN | SYSTOLIC BLOOD PRESSURE: 114 MMHG | WEIGHT: 169.98 LBS | OXYGEN SATURATION: 98 % | RESPIRATION RATE: 18 BRPM | HEART RATE: 88 BPM
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
## 2018-11-11 DIAGNOSIS — Z98.89 OTHER SPECIFIED POSTPROCEDURAL STATES: Chronic | ICD-10-CM

## 2018-11-11 LAB
APPEARANCE UR: CLEAR — SIGNIFICANT CHANGE UP
BACTERIA # UR AUTO: ABNORMAL
BILIRUB UR-MCNC: NEGATIVE — SIGNIFICANT CHANGE UP
COLOR SPEC: SIGNIFICANT CHANGE UP
DIFF PNL FLD: NEGATIVE — SIGNIFICANT CHANGE UP
EPI CELLS # UR: 4 /HPF — SIGNIFICANT CHANGE UP
GLUCOSE UR QL: NEGATIVE — SIGNIFICANT CHANGE UP
HYALINE CASTS # UR AUTO: 1 /LPF — SIGNIFICANT CHANGE UP (ref 0–2)
KETONES UR-MCNC: NEGATIVE — SIGNIFICANT CHANGE UP
LEUKOCYTE ESTERASE UR-ACNC: NEGATIVE — SIGNIFICANT CHANGE UP
NITRITE UR-MCNC: NEGATIVE — SIGNIFICANT CHANGE UP
PH UR: 7 — SIGNIFICANT CHANGE UP (ref 5–8)
PROT UR-MCNC: NEGATIVE — SIGNIFICANT CHANGE UP
RBC CASTS # UR COMP ASSIST: 0 /HPF — SIGNIFICANT CHANGE UP (ref 0–4)
SP GR SPEC: 1.01 — LOW (ref 1.01–1.02)
UROBILINOGEN FLD QL: NEGATIVE — SIGNIFICANT CHANGE UP
WBC UR QL: 2 /HPF — SIGNIFICANT CHANGE UP (ref 0–5)

## 2018-11-11 PROCEDURE — 93005 ELECTROCARDIOGRAM TRACING: CPT

## 2018-11-11 PROCEDURE — 99284 EMERGENCY DEPT VISIT MOD MDM: CPT

## 2018-11-11 PROCEDURE — 76815 OB US LIMITED FETUS(S): CPT | Mod: 26

## 2018-11-11 PROCEDURE — 93010 ELECTROCARDIOGRAM REPORT: CPT

## 2018-11-11 PROCEDURE — 99284 EMERGENCY DEPT VISIT MOD MDM: CPT | Mod: 25

## 2018-11-11 PROCEDURE — 76815 OB US LIMITED FETUS(S): CPT

## 2018-11-11 PROCEDURE — 87086 URINE CULTURE/COLONY COUNT: CPT

## 2018-11-11 PROCEDURE — 81001 URINALYSIS AUTO W/SCOPE: CPT

## 2018-11-11 NOTE — ED ADULT NURSE NOTE - NSIMPLEMENTINTERV_GEN_ALL_ED
Implemented All Universal Safety Interventions:  [place] to call system. Call bell, personal items and telephone within reach. Instruct patient to call for assistance. Room bathroom lighting operational. Non-slip footwear when patient is off stretcher. Physically safe environment: no spills, clutter or unnecessary equipment. Stretcher in lowest position, wheels locked, appropriate side rails in place.

## 2018-11-11 NOTE — ED PROVIDER NOTE - PHYSICAL EXAMINATION
Attending Nancy Hughes: Gen: NAD, heent: atrauamtic, eomi, perrla, mmm, op pink, uvula midline, neck; nttp, no nuchal rigidity, chest: nttp, no crepitus, cv: rrr, no murmurs, lungs: ctab, abd: soft, nontender, nondistended, no peritoneal signs, +BS, no guarding, ext: wwp,ttp left lateral lumbar sacral area. no midline ttp, skin: no rash, neuro: awake and alert, following commands, speech clear, sensation and strength intact, no focal deficits

## 2018-11-11 NOTE — ED PROVIDER NOTE - OBJECTIVE STATEMENT
Attending Nancy Hughes: 29 y/o  at approximately 21 weeks gestation presenting with back pain. pt was visiting her mother in the micu, currently having goals of care discussion as mother is not doing well and discussing need for intubation. pt felt very lightheaded and dizzy. went to her knees. did not lose consciousness. was caught by her  who layed her down. afterward able to walk. has been having back pain with this pregnancy. after falling ain worse. no numbness or tingling. did not eat anything today. no vaginal bleeding. pregnancy uncomplicated thus far. has had prior syncopal episodes

## 2018-11-11 NOTE — ED ADULT NURSE NOTE - OBJECTIVE STATEMENT
28 yr old female , 21 weeks pregnant, was in MICU for critical ill mom , was making important decision for Mom's care, became dizzy and had near syncope episode. Denies LOC or hitting head. Pt did not eat this am .  ,  caught pt.  C/o l lower back pain, nonrad.  Denies burn or diff urinating. Denies blood in urine. Denies vaginal bleeding. Denies abd pain. Saw GYN October. . Has h/o endometriosis. Denies N/V. Amb to BR with assistance.  Nimo oral intake. at bedside.

## 2018-11-11 NOTE — CONSULT NOTE ADULT - PROBLEM SELECTOR RECOMMENDATION 9
ddx: vasovagal episode  - pt very overwhelmed  - no OB intervention   - f/u with OB as scheduled  - f/u EKG    Hannah Dietz PGY2  d/w Dr Webster

## 2018-11-11 NOTE — ED ADULT TRIAGE NOTE - CHIEF COMPLAINT QUOTE
Pt "fell to knees" while visiting mother in MICU - sudden onset of back pain. Pt is 21 weeks pregnant.

## 2018-11-11 NOTE — CONSULT NOTE ADULT - SUBJECTIVE AND OBJECTIVE BOX
VANIA KAERNEY  28y  Female [id number]    HPI:  29 yo  ÁNGEL 3/20/19 at 21w4d presents to the ED after a fall. Pt was visiting her mother in the MICU.  Pt reports her mother is not doing well and she was overcome with emotions and fell to her knees.  pt did not hit her abdomen, did not lose consciousness.  Pt reports she has lower back pain at times during her pregnancy and is experiencing low back pain today.  Reports no abdominal pain, no cramping, no leaking of fluid or vaginal bleeding. Denies lightheadness, dizziness, chest pain.      Name of GYN Physician: Dr Elder    POB:  current    Pgyn: Denies fibroids, cysts, STI's, Abnormal pap smears . History of PCOS.  PMH: denies  PSH: breast reduction    Home meds: tumeric, PNV      Social History:  Denies smoking use, drug use, alcohol use.     Vital Signs Last 24 Hrs  T(C): --  T(F): --  HR: 88 (2018 10:06) (88 - 88)  BP: 114/77 (2018 10:06) (114/77 - 114/77)  BP(mean): --  RR: 18 (2018 10:06) (18 - 18)  SpO2: 98% (2018 10:06) (98% - 98%)    Physical Exam:   General: sitting comfortably in bed, NAD   CV: RR, S1S2 present, no m/r/g  Lungs: CTA b/l  Abd: Soft, non-tender, gravid.   Ext: non-tender b/l, no edema   FHR: 140s- per ED       Urinalysis Basic - ( 2018 11:00 )    Color: Light Yellow / Appearance: Clear / S.008 / pH: x  Gluc: x / Ketone: Negative  / Bili: Negative / Urobili: Negative   Blood: x / Protein: Negative / Nitrite: Negative   Leuk Esterase: Negative / RBC: 0 /hpf / WBC 2 /hpf   Sq Epi: x / Non Sq Epi: 4 /hpf / Bacteria: Few

## 2018-11-11 NOTE — ED PROVIDER NOTE - MEDICAL DECISION MAKING DETAILS
Attending Nancy Hughes: 29 y/o female at approximately 21 weeks gestation presenting after near syncopal episode with back pain. pt currently under sig amount of stress. history suggsetive of likely vasovagal. no trauma to abdomen. no dysuria or fevers. BP stable. will check ekg and UA. fetal heart rate of 141. pt prefers to go quickly back upstairs. will d/w OB and likely d/c

## 2018-11-12 LAB
CULTURE RESULTS: SIGNIFICANT CHANGE UP
SPECIMEN SOURCE: SIGNIFICANT CHANGE UP

## 2018-11-19 ENCOUNTER — NON-APPOINTMENT (OUTPATIENT)
Age: 28
End: 2018-11-19

## 2018-11-19 ENCOUNTER — APPOINTMENT (OUTPATIENT)
Dept: OBGYN | Facility: CLINIC | Age: 28
End: 2018-11-19
Payer: MEDICARE

## 2018-11-19 VITALS
HEIGHT: 62 IN | SYSTOLIC BLOOD PRESSURE: 92 MMHG | WEIGHT: 176 LBS | BODY MASS INDEX: 32.39 KG/M2 | DIASTOLIC BLOOD PRESSURE: 58 MMHG

## 2018-11-19 PROCEDURE — 0502F SUBSEQUENT PRENATAL CARE: CPT

## 2018-12-14 ENCOUNTER — NON-APPOINTMENT (OUTPATIENT)
Age: 28
End: 2018-12-14

## 2018-12-20 ENCOUNTER — NON-APPOINTMENT (OUTPATIENT)
Age: 28
End: 2018-12-20

## 2018-12-20 ENCOUNTER — APPOINTMENT (OUTPATIENT)
Dept: OBGYN | Facility: CLINIC | Age: 28
End: 2018-12-20
Payer: MEDICARE

## 2018-12-20 VITALS
WEIGHT: 179 LBS | BODY MASS INDEX: 32.94 KG/M2 | DIASTOLIC BLOOD PRESSURE: 67 MMHG | SYSTOLIC BLOOD PRESSURE: 98 MMHG | HEIGHT: 62 IN

## 2018-12-20 DIAGNOSIS — Z86.19 PERSONAL HISTORY OF OTHER INFECTIOUS AND PARASITIC DISEASES: ICD-10-CM

## 2018-12-20 DIAGNOSIS — N39.0 URINARY TRACT INFECTION, SITE NOT SPECIFIED: ICD-10-CM

## 2018-12-20 PROCEDURE — 0502F SUBSEQUENT PRENATAL CARE: CPT

## 2018-12-26 ENCOUNTER — OTHER (OUTPATIENT)
Age: 28
End: 2018-12-26

## 2018-12-26 LAB
BACTERIA UR CULT: ABNORMAL
BASOPHILS # BLD AUTO: 0.02 K/UL
BASOPHILS NFR BLD AUTO: 0.2 %
CANDIDA VAG CYTO: DETECTED
EOSINOPHIL # BLD AUTO: 0.51 K/UL
EOSINOPHIL NFR BLD AUTO: 4.7 %
G VAGINALIS+PREV SP MTYP VAG QL MICRO: NOT DETECTED
HCT VFR BLD CALC: 36.8 %
HGB BLD-MCNC: 11.9 G/DL
IMM GRANULOCYTES NFR BLD AUTO: 0.8 %
LYMPHOCYTES # BLD AUTO: 2.22 K/UL
LYMPHOCYTES NFR BLD AUTO: 20.3 %
MAN DIFF?: NORMAL
MCHC RBC-ENTMCNC: 29.7 PG
MCHC RBC-ENTMCNC: 32.3 GM/DL
MCV RBC AUTO: 91.8 FL
MONOCYTES # BLD AUTO: 0.67 K/UL
MONOCYTES NFR BLD AUTO: 6.1 %
NEUTROPHILS # BLD AUTO: 7.45 K/UL
NEUTROPHILS NFR BLD AUTO: 67.9 %
PLATELET # BLD AUTO: 240 K/UL
RBC # BLD: 4.01 M/UL
RBC # FLD: 14.7 %
T VAGINALIS VAG QL WET PREP: NOT DETECTED
WBC # FLD AUTO: 10.96 K/UL

## 2018-12-27 ENCOUNTER — OUTPATIENT (OUTPATIENT)
Dept: OUTPATIENT SERVICES | Facility: HOSPITAL | Age: 28
LOS: 1 days | End: 2018-12-27
Payer: COMMERCIAL

## 2018-12-27 DIAGNOSIS — Z98.89 OTHER SPECIFIED POSTPROCEDURAL STATES: Chronic | ICD-10-CM

## 2018-12-27 DIAGNOSIS — Z3A.00 WEEKS OF GESTATION OF PREGNANCY NOT SPECIFIED: ICD-10-CM

## 2018-12-27 DIAGNOSIS — O26.899 OTHER SPECIFIED PREGNANCY RELATED CONDITIONS, UNSPECIFIED TRIMESTER: ICD-10-CM

## 2018-12-27 PROCEDURE — 59025 FETAL NON-STRESS TEST: CPT | Mod: 26

## 2019-01-04 ENCOUNTER — OTHER (OUTPATIENT)
Age: 29
End: 2019-01-04

## 2019-01-04 ENCOUNTER — APPOINTMENT (OUTPATIENT)
Dept: OBGYN | Facility: CLINIC | Age: 29
End: 2019-01-04
Payer: MEDICARE

## 2019-01-04 VITALS
HEIGHT: 62 IN | DIASTOLIC BLOOD PRESSURE: 71 MMHG | SYSTOLIC BLOOD PRESSURE: 105 MMHG | WEIGHT: 183 LBS | BODY MASS INDEX: 33.68 KG/M2

## 2019-01-04 DIAGNOSIS — Z87.09 PERSONAL HISTORY OF OTHER DISEASES OF THE RESPIRATORY SYSTEM: ICD-10-CM

## 2019-01-04 PROCEDURE — 0502F SUBSEQUENT PRENATAL CARE: CPT

## 2019-01-05 ENCOUNTER — OUTPATIENT (OUTPATIENT)
Dept: OUTPATIENT SERVICES | Facility: HOSPITAL | Age: 29
LOS: 1 days | End: 2019-01-05
Payer: COMMERCIAL

## 2019-01-05 ENCOUNTER — APPOINTMENT (OUTPATIENT)
Dept: RADIOLOGY | Facility: IMAGING CENTER | Age: 29
End: 2019-01-05
Payer: MEDICARE

## 2019-01-05 DIAGNOSIS — Z98.89 OTHER SPECIFIED POSTPROCEDURAL STATES: Chronic | ICD-10-CM

## 2019-01-05 DIAGNOSIS — J06.9 ACUTE UPPER RESPIRATORY INFECTION, UNSPECIFIED: ICD-10-CM

## 2019-01-05 PROCEDURE — 71046 X-RAY EXAM CHEST 2 VIEWS: CPT | Mod: 26

## 2019-01-05 PROCEDURE — 71046 X-RAY EXAM CHEST 2 VIEWS: CPT

## 2019-01-07 ENCOUNTER — LABORATORY RESULT (OUTPATIENT)
Age: 29
End: 2019-01-07

## 2019-01-07 ENCOUNTER — OTHER (OUTPATIENT)
Age: 29
End: 2019-01-07

## 2019-01-08 LAB
GLUCOSE 1H P 100 G GLC PO SERPL-MCNC: 165 MG/DL
GLUCOSE 2H P CHAL SERPL-MCNC: 186 MG/DL
GLUCOSE 3H P CHAL SERPL-MCNC: 177 MG/DL
GLUCOSE BS SERPL-MCNC: 99 MG/DL

## 2019-01-09 ENCOUNTER — APPOINTMENT (OUTPATIENT)
Dept: OBGYN | Facility: CLINIC | Age: 29
End: 2019-01-09

## 2019-01-09 VITALS
DIASTOLIC BLOOD PRESSURE: 75 MMHG | HEIGHT: 62 IN | WEIGHT: 184 LBS | BODY MASS INDEX: 33.86 KG/M2 | SYSTOLIC BLOOD PRESSURE: 112 MMHG

## 2019-01-10 ENCOUNTER — APPOINTMENT (OUTPATIENT)
Dept: OBGYN | Facility: CLINIC | Age: 29
End: 2019-01-10

## 2019-01-10 ENCOUNTER — OTHER (OUTPATIENT)
Age: 29
End: 2019-01-10

## 2019-01-22 ENCOUNTER — ASOB RESULT (OUTPATIENT)
Age: 29
End: 2019-01-22

## 2019-01-22 ENCOUNTER — APPOINTMENT (OUTPATIENT)
Dept: MATERNAL FETAL MEDICINE | Facility: CLINIC | Age: 29
End: 2019-01-22
Payer: MEDICARE

## 2019-01-22 PROCEDURE — G0109 DIAB MANAGE TRN IND/GROUP: CPT

## 2019-01-25 ENCOUNTER — APPOINTMENT (OUTPATIENT)
Dept: ANTEPARTUM | Facility: CLINIC | Age: 29
End: 2019-01-25
Payer: MEDICARE

## 2019-01-25 ENCOUNTER — ASOB RESULT (OUTPATIENT)
Age: 29
End: 2019-01-25

## 2019-01-25 PROCEDURE — 76819 FETAL BIOPHYS PROFIL W/O NST: CPT

## 2019-01-25 PROCEDURE — 76816 OB US FOLLOW-UP PER FETUS: CPT

## 2019-02-04 ENCOUNTER — APPOINTMENT (OUTPATIENT)
Dept: OBGYN | Facility: CLINIC | Age: 29
End: 2019-02-04
Payer: MEDICARE

## 2019-02-04 ENCOUNTER — NON-APPOINTMENT (OUTPATIENT)
Age: 29
End: 2019-02-04

## 2019-02-04 VITALS
HEIGHT: 62 IN | SYSTOLIC BLOOD PRESSURE: 120 MMHG | WEIGHT: 186.4 LBS | BODY MASS INDEX: 34.3 KG/M2 | DIASTOLIC BLOOD PRESSURE: 74 MMHG

## 2019-02-04 PROCEDURE — 90472 IMMUNIZATION ADMIN EACH ADD: CPT

## 2019-02-04 PROCEDURE — 0502F SUBSEQUENT PRENATAL CARE: CPT

## 2019-02-04 PROCEDURE — G0008: CPT

## 2019-02-04 PROCEDURE — 90688 IIV4 VACCINE SPLT 0.5 ML IM: CPT

## 2019-02-04 PROCEDURE — 90715 TDAP VACCINE 7 YRS/> IM: CPT

## 2019-02-04 RX ORDER — CEPHALEXIN 500 MG/1
500 TABLET ORAL EVERY 8 HOURS
Qty: 21 | Refills: 0 | Status: DISCONTINUED | COMMUNITY
Start: 2018-12-26 | End: 2019-02-04

## 2019-02-04 RX ORDER — CEPHALEXIN 500 MG/1
500 CAPSULE ORAL 4 TIMES DAILY
Qty: 28 | Refills: 0 | Status: DISCONTINUED | COMMUNITY
Start: 2018-10-19 | End: 2019-02-04

## 2019-02-04 RX ORDER — TERCONAZOLE 4 MG/G
0.4 CREAM VAGINAL
Qty: 1 | Refills: 1 | Status: DISCONTINUED | COMMUNITY
Start: 2018-10-23 | End: 2019-02-04

## 2019-02-04 RX ORDER — TERCONAZOLE 8 MG/G
0.8 CREAM VAGINAL
Qty: 1 | Refills: 2 | Status: DISCONTINUED | COMMUNITY
Start: 2018-12-26 | End: 2019-02-04

## 2019-02-04 RX ORDER — NITROFURANTOIN (MONOHYDRATE/MACROCRYSTALS) 25; 75 MG/1; MG/1
100 CAPSULE ORAL TWICE DAILY
Qty: 14 | Refills: 0 | Status: DISCONTINUED | COMMUNITY
Start: 2018-09-06 | End: 2019-02-04

## 2019-02-07 ENCOUNTER — ASOB RESULT (OUTPATIENT)
Age: 29
End: 2019-02-07

## 2019-02-07 ENCOUNTER — APPOINTMENT (OUTPATIENT)
Dept: ANTEPARTUM | Facility: CLINIC | Age: 29
End: 2019-02-07
Payer: MEDICARE

## 2019-02-07 ENCOUNTER — APPOINTMENT (OUTPATIENT)
Dept: MATERNAL FETAL MEDICINE | Facility: CLINIC | Age: 29
End: 2019-02-07
Payer: MEDICARE

## 2019-02-07 PROCEDURE — 76819 FETAL BIOPHYS PROFIL W/O NST: CPT

## 2019-02-07 PROCEDURE — G0108 DIAB MANAGE TRN  PER INDIV: CPT

## 2019-02-19 ENCOUNTER — OTHER (OUTPATIENT)
Age: 29
End: 2019-02-19

## 2019-02-21 ENCOUNTER — APPOINTMENT (OUTPATIENT)
Dept: ANTEPARTUM | Facility: HOSPITAL | Age: 29
End: 2019-02-21

## 2019-02-21 ENCOUNTER — ASOB RESULT (OUTPATIENT)
Age: 29
End: 2019-02-21

## 2019-02-21 ENCOUNTER — OUTPATIENT (OUTPATIENT)
Dept: OUTPATIENT SERVICES | Facility: HOSPITAL | Age: 29
LOS: 1 days | End: 2019-02-21

## 2019-02-21 DIAGNOSIS — Z3A.00 WEEKS OF GESTATION OF PREGNANCY NOT SPECIFIED: ICD-10-CM

## 2019-02-21 DIAGNOSIS — O26.899 OTHER SPECIFIED PREGNANCY RELATED CONDITIONS, UNSPECIFIED TRIMESTER: ICD-10-CM

## 2019-02-21 DIAGNOSIS — Z98.89 OTHER SPECIFIED POSTPROCEDURAL STATES: Chronic | ICD-10-CM

## 2019-02-21 LAB
APPEARANCE UR: SIGNIFICANT CHANGE UP
BACTERIA # UR AUTO: NEGATIVE — SIGNIFICANT CHANGE UP
BILIRUB UR-MCNC: NEGATIVE — SIGNIFICANT CHANGE UP
BLOOD UR QL VISUAL: NEGATIVE — SIGNIFICANT CHANGE UP
COLOR SPEC: SIGNIFICANT CHANGE UP
GLUCOSE UR-MCNC: NEGATIVE — SIGNIFICANT CHANGE UP
HYALINE CASTS # UR AUTO: NEGATIVE — SIGNIFICANT CHANGE UP
KETONES UR-MCNC: NEGATIVE — SIGNIFICANT CHANGE UP
LEUKOCYTE ESTERASE UR-ACNC: NEGATIVE — SIGNIFICANT CHANGE UP
NITRITE UR-MCNC: NEGATIVE — SIGNIFICANT CHANGE UP
PH UR: 7 — SIGNIFICANT CHANGE UP (ref 5–8)
PROT UR-MCNC: 10 — SIGNIFICANT CHANGE UP
RBC CASTS # UR COMP ASSIST: SIGNIFICANT CHANGE UP (ref 0–?)
SP GR SPEC: 1.01 — SIGNIFICANT CHANGE UP (ref 1–1.04)
SQUAMOUS # UR AUTO: SIGNIFICANT CHANGE UP
UROBILINOGEN FLD QL: NORMAL — SIGNIFICANT CHANGE UP
WBC UR QL: SIGNIFICANT CHANGE UP (ref 0–?)

## 2019-02-22 ENCOUNTER — APPOINTMENT (OUTPATIENT)
Dept: ANTEPARTUM | Facility: CLINIC | Age: 29
End: 2019-02-22

## 2019-02-22 ENCOUNTER — ASOB RESULT (OUTPATIENT)
Age: 29
End: 2019-02-22

## 2019-02-22 ENCOUNTER — APPOINTMENT (OUTPATIENT)
Dept: MATERNAL FETAL MEDICINE | Facility: CLINIC | Age: 29
End: 2019-02-22
Payer: MEDICARE

## 2019-02-22 PROCEDURE — G0108 DIAB MANAGE TRN  PER INDIV: CPT

## 2019-02-25 ENCOUNTER — NON-APPOINTMENT (OUTPATIENT)
Age: 29
End: 2019-02-25

## 2019-02-25 ENCOUNTER — APPOINTMENT (OUTPATIENT)
Dept: OBGYN | Facility: CLINIC | Age: 29
End: 2019-02-25
Payer: MEDICARE

## 2019-02-25 VITALS
DIASTOLIC BLOOD PRESSURE: 74 MMHG | BODY MASS INDEX: 35.15 KG/M2 | WEIGHT: 191 LBS | SYSTOLIC BLOOD PRESSURE: 117 MMHG | HEIGHT: 62 IN

## 2019-02-25 LAB — GLUCOSE 1H P 50 G GLC PO SERPL-MCNC: 173 MG/DL

## 2019-02-25 PROCEDURE — 0502F SUBSEQUENT PRENATAL CARE: CPT

## 2019-03-04 ENCOUNTER — APPOINTMENT (OUTPATIENT)
Dept: OBGYN | Facility: CLINIC | Age: 29
End: 2019-03-04
Payer: MEDICARE

## 2019-03-04 ENCOUNTER — NON-APPOINTMENT (OUTPATIENT)
Age: 29
End: 2019-03-04

## 2019-03-04 VITALS
BODY MASS INDEX: 35.2 KG/M2 | HEIGHT: 62 IN | DIASTOLIC BLOOD PRESSURE: 73 MMHG | SYSTOLIC BLOOD PRESSURE: 104 MMHG | WEIGHT: 191.3 LBS

## 2019-03-04 LAB
GP B STREP DNA SPEC QL NAA+PROBE: DETECTED
GP B STREP DNA SPEC QL NAA+PROBE: NORMAL
SOURCE GBS: NORMAL

## 2019-03-04 PROCEDURE — 0502F SUBSEQUENT PRENATAL CARE: CPT

## 2019-03-08 ENCOUNTER — APPOINTMENT (OUTPATIENT)
Dept: ANTEPARTUM | Facility: CLINIC | Age: 29
End: 2019-03-08
Payer: COMMERCIAL

## 2019-03-08 ENCOUNTER — APPOINTMENT (OUTPATIENT)
Dept: MATERNAL FETAL MEDICINE | Facility: CLINIC | Age: 29
End: 2019-03-08
Payer: COMMERCIAL

## 2019-03-08 ENCOUNTER — ASOB RESULT (OUTPATIENT)
Age: 29
End: 2019-03-08

## 2019-03-08 ENCOUNTER — OUTPATIENT (OUTPATIENT)
Dept: OUTPATIENT SERVICES | Facility: HOSPITAL | Age: 29
LOS: 1 days | End: 2019-03-08

## 2019-03-08 DIAGNOSIS — Z98.89 OTHER SPECIFIED POSTPROCEDURAL STATES: Chronic | ICD-10-CM

## 2019-03-08 PROCEDURE — 99241 OFFICE CONSULTATION NEW/ESTAB PATIENT 15 MIN: CPT | Mod: 25

## 2019-03-08 PROCEDURE — 76818 FETAL BIOPHYS PROFILE W/NST: CPT | Mod: 26

## 2019-03-08 PROCEDURE — G0108 DIAB MANAGE TRN  PER INDIV: CPT

## 2019-03-11 ENCOUNTER — OTHER (OUTPATIENT)
Age: 29
End: 2019-03-11

## 2019-03-13 ENCOUNTER — OUTPATIENT (OUTPATIENT)
Dept: OUTPATIENT SERVICES | Facility: HOSPITAL | Age: 29
LOS: 1 days | End: 2019-03-13

## 2019-03-13 ENCOUNTER — APPOINTMENT (OUTPATIENT)
Dept: OBGYN | Facility: CLINIC | Age: 29
End: 2019-03-13
Payer: COMMERCIAL

## 2019-03-13 ENCOUNTER — NON-APPOINTMENT (OUTPATIENT)
Age: 29
End: 2019-03-13

## 2019-03-13 VITALS
HEIGHT: 62 IN | WEIGHT: 194 LBS | BODY MASS INDEX: 35.7 KG/M2 | SYSTOLIC BLOOD PRESSURE: 114 MMHG | DIASTOLIC BLOOD PRESSURE: 80 MMHG

## 2019-03-13 DIAGNOSIS — O24.419 GESTATIONAL DIABETES MELLITUS IN PREGNANCY, UNSPECIFIED CONTROL: ICD-10-CM

## 2019-03-13 DIAGNOSIS — Z98.89 OTHER SPECIFIED POSTPROCEDURAL STATES: Chronic | ICD-10-CM

## 2019-03-13 LAB
ANION GAP SERPL CALC-SCNC: 13 MMO/L — SIGNIFICANT CHANGE UP (ref 7–14)
APPEARANCE UR: CLEAR — SIGNIFICANT CHANGE UP
BACTERIA # UR AUTO: NEGATIVE — SIGNIFICANT CHANGE UP
BILIRUB UR-MCNC: NEGATIVE — SIGNIFICANT CHANGE UP
BLD GP AB SCN SERPL QL: NEGATIVE — SIGNIFICANT CHANGE UP
BLOOD UR QL VISUAL: NEGATIVE — SIGNIFICANT CHANGE UP
BUN SERPL-MCNC: 8 MG/DL — SIGNIFICANT CHANGE UP (ref 7–23)
CALCIUM SERPL-MCNC: 9.2 MG/DL — SIGNIFICANT CHANGE UP (ref 8.4–10.5)
CHLORIDE SERPL-SCNC: 106 MMOL/L — SIGNIFICANT CHANGE UP (ref 98–107)
CO2 SERPL-SCNC: 18 MMOL/L — LOW (ref 22–31)
COLOR SPEC: YELLOW — SIGNIFICANT CHANGE UP
CREAT SERPL-MCNC: 0.5 MG/DL — SIGNIFICANT CHANGE UP (ref 0.5–1.3)
GLUCOSE SERPL-MCNC: 106 MG/DL — HIGH (ref 70–99)
GLUCOSE UR-MCNC: NEGATIVE — SIGNIFICANT CHANGE UP
HCT VFR BLD CALC: 37.8 % — SIGNIFICANT CHANGE UP (ref 34.5–45)
HGB BLD-MCNC: 12.1 G/DL — SIGNIFICANT CHANGE UP (ref 11.5–15.5)
HYALINE CASTS # UR AUTO: NEGATIVE — SIGNIFICANT CHANGE UP
KETONES UR-MCNC: NEGATIVE — SIGNIFICANT CHANGE UP
LEUKOCYTE ESTERASE UR-ACNC: SIGNIFICANT CHANGE UP
MCHC RBC-ENTMCNC: 28 PG — SIGNIFICANT CHANGE UP (ref 27–34)
MCHC RBC-ENTMCNC: 32 % — SIGNIFICANT CHANGE UP (ref 32–36)
MCV RBC AUTO: 87.5 FL — SIGNIFICANT CHANGE UP (ref 80–100)
NITRITE UR-MCNC: NEGATIVE — SIGNIFICANT CHANGE UP
NRBC # FLD: 0 K/UL — LOW (ref 25–125)
PH UR: 6.5 — SIGNIFICANT CHANGE UP (ref 5–8)
PLATELET # BLD AUTO: 214 K/UL — SIGNIFICANT CHANGE UP (ref 150–400)
PMV BLD: 10.8 FL — SIGNIFICANT CHANGE UP (ref 7–13)
POTASSIUM SERPL-MCNC: 4.1 MMOL/L — SIGNIFICANT CHANGE UP (ref 3.5–5.3)
POTASSIUM SERPL-SCNC: 4.1 MMOL/L — SIGNIFICANT CHANGE UP (ref 3.5–5.3)
PROT UR-MCNC: 30 — SIGNIFICANT CHANGE UP
RBC # BLD: 4.32 M/UL — SIGNIFICANT CHANGE UP (ref 3.8–5.2)
RBC # FLD: 14.3 % — SIGNIFICANT CHANGE UP (ref 10.3–14.5)
RBC CASTS # UR COMP ASSIST: SIGNIFICANT CHANGE UP (ref 0–?)
RH IG SCN BLD-IMP: POSITIVE — SIGNIFICANT CHANGE UP
SODIUM SERPL-SCNC: 137 MMOL/L — SIGNIFICANT CHANGE UP (ref 135–145)
SP GR SPEC: 1.02 — SIGNIFICANT CHANGE UP (ref 1–1.04)
SQUAMOUS # UR AUTO: SIGNIFICANT CHANGE UP
UROBILINOGEN FLD QL: NORMAL — SIGNIFICANT CHANGE UP
WBC # BLD: 10.59 K/UL — HIGH (ref 3.8–10.5)
WBC # FLD AUTO: 10.59 K/UL — HIGH (ref 3.8–10.5)
WBC UR QL: HIGH (ref 0–?)

## 2019-03-13 PROCEDURE — 0502F SUBSEQUENT PRENATAL CARE: CPT

## 2019-03-14 ENCOUNTER — ASOB RESULT (OUTPATIENT)
Age: 29
End: 2019-03-14

## 2019-03-14 ENCOUNTER — APPOINTMENT (OUTPATIENT)
Dept: ANTEPARTUM | Facility: CLINIC | Age: 29
End: 2019-03-14
Payer: COMMERCIAL

## 2019-03-14 ENCOUNTER — APPOINTMENT (OUTPATIENT)
Dept: ANTEPARTUM | Facility: HOSPITAL | Age: 29
End: 2019-03-14

## 2019-03-14 ENCOUNTER — OUTPATIENT (OUTPATIENT)
Dept: OUTPATIENT SERVICES | Facility: HOSPITAL | Age: 29
LOS: 1 days | End: 2019-03-14

## 2019-03-14 DIAGNOSIS — Z98.89 OTHER SPECIFIED POSTPROCEDURAL STATES: Chronic | ICD-10-CM

## 2019-03-14 LAB — T PALLIDUM AB TITR SER: NEGATIVE — SIGNIFICANT CHANGE UP

## 2019-03-14 PROCEDURE — 76818 FETAL BIOPHYS PROFILE W/NST: CPT | Mod: 26

## 2019-03-14 PROCEDURE — 76816 OB US FOLLOW-UP PER FETUS: CPT

## 2019-03-18 ENCOUNTER — INPATIENT (INPATIENT)
Facility: HOSPITAL | Age: 29
LOS: 4 days | Discharge: HOME | End: 2019-03-23
Attending: OBSTETRICS & GYNECOLOGY | Admitting: OBSTETRICS & GYNECOLOGY
Payer: COMMERCIAL

## 2019-03-18 VITALS — WEIGHT: 194.01 LBS | HEIGHT: 62 IN

## 2019-03-18 DIAGNOSIS — O24.419 GESTATIONAL DIABETES MELLITUS IN PREGNANCY, UNSPECIFIED CONTROL: ICD-10-CM

## 2019-03-18 DIAGNOSIS — Z98.89 OTHER SPECIFIED POSTPROCEDURAL STATES: Chronic | ICD-10-CM

## 2019-03-18 DIAGNOSIS — O40.3XX0 POLYHYDRAMNIOS, THIRD TRIMESTER, NOT APPLICABLE OR UNSPECIFIED: ICD-10-CM

## 2019-03-18 DIAGNOSIS — O24.410 GESTATIONAL DIABETES MELLITUS IN PREGNANCY, DIET CONTROLLED: ICD-10-CM

## 2019-03-18 DIAGNOSIS — O34.93 MATERNAL CARE FOR ABNORMALITY OF PELVIC ORGAN, UNSPECIFIED, THIRD TRIMESTER: ICD-10-CM

## 2019-03-18 LAB
BASOPHILS # BLD AUTO: 0.06 K/UL — SIGNIFICANT CHANGE UP (ref 0–0.2)
BASOPHILS NFR BLD AUTO: 0.5 % — SIGNIFICANT CHANGE UP (ref 0–2)
BLD GP AB SCN SERPL QL: NEGATIVE — SIGNIFICANT CHANGE UP
EOSINOPHIL # BLD AUTO: 0.29 K/UL — SIGNIFICANT CHANGE UP (ref 0–0.5)
EOSINOPHIL NFR BLD AUTO: 2.6 % — SIGNIFICANT CHANGE UP (ref 0–6)
GLUCOSE BLDC GLUCOMTR-MCNC: 110 MG/DL — HIGH (ref 70–99)
GLUCOSE BLDC GLUCOMTR-MCNC: 128 MG/DL — HIGH (ref 70–99)
GLUCOSE BLDC GLUCOMTR-MCNC: 81 MG/DL — SIGNIFICANT CHANGE UP (ref 70–99)
HCT VFR BLD CALC: 36.5 % — SIGNIFICANT CHANGE UP (ref 34.5–45)
HGB BLD-MCNC: 11.7 G/DL — SIGNIFICANT CHANGE UP (ref 11.5–15.5)
IMM GRANULOCYTES NFR BLD AUTO: 0.9 % — SIGNIFICANT CHANGE UP (ref 0–1.5)
LYMPHOCYTES # BLD AUTO: 2.29 K/UL — SIGNIFICANT CHANGE UP (ref 1–3.3)
LYMPHOCYTES # BLD AUTO: 20.7 % — SIGNIFICANT CHANGE UP (ref 13–44)
MCHC RBC-ENTMCNC: 28.1 PG — SIGNIFICANT CHANGE UP (ref 27–34)
MCHC RBC-ENTMCNC: 32.1 % — SIGNIFICANT CHANGE UP (ref 32–36)
MCV RBC AUTO: 87.5 FL — SIGNIFICANT CHANGE UP (ref 80–100)
MONOCYTES # BLD AUTO: 1.05 K/UL — HIGH (ref 0–0.9)
MONOCYTES NFR BLD AUTO: 9.5 % — SIGNIFICANT CHANGE UP (ref 2–14)
NEUTROPHILS # BLD AUTO: 7.28 K/UL — SIGNIFICANT CHANGE UP (ref 1.8–7.4)
NEUTROPHILS NFR BLD AUTO: 65.8 % — SIGNIFICANT CHANGE UP (ref 43–77)
NRBC # FLD: 0 K/UL — LOW (ref 25–125)
PLATELET # BLD AUTO: 213 K/UL — SIGNIFICANT CHANGE UP (ref 150–400)
PMV BLD: 10.9 FL — SIGNIFICANT CHANGE UP (ref 7–13)
RBC # BLD: 4.17 M/UL — SIGNIFICANT CHANGE UP (ref 3.8–5.2)
RBC # FLD: 14.8 % — HIGH (ref 10.3–14.5)
RH IG SCN BLD-IMP: POSITIVE — SIGNIFICANT CHANGE UP
WBC # BLD: 11.07 K/UL — HIGH (ref 3.8–10.5)
WBC # FLD AUTO: 11.07 K/UL — HIGH (ref 3.8–10.5)

## 2019-03-18 RX ORDER — SODIUM CHLORIDE 9 MG/ML
1000 INJECTION, SOLUTION INTRAVENOUS
Qty: 0 | Refills: 0 | Status: DISCONTINUED | OUTPATIENT
Start: 2019-03-18 | End: 2019-03-20

## 2019-03-18 RX ORDER — OXYTOCIN 10 UNIT/ML
333.33 VIAL (ML) INJECTION
Qty: 20 | Refills: 0 | Status: DISCONTINUED | OUTPATIENT
Start: 2019-03-18 | End: 2019-03-20

## 2019-03-18 RX ORDER — AMPICILLIN TRIHYDRATE 250 MG
1 CAPSULE ORAL EVERY 4 HOURS
Qty: 0 | Refills: 0 | Status: DISCONTINUED | OUTPATIENT
Start: 2019-03-18 | End: 2019-03-20

## 2019-03-18 RX ORDER — AMPICILLIN TRIHYDRATE 250 MG
CAPSULE ORAL
Qty: 0 | Refills: 0 | Status: DISCONTINUED | OUTPATIENT
Start: 2019-03-18 | End: 2019-03-20

## 2019-03-18 RX ORDER — SODIUM CHLORIDE 9 MG/ML
1000 INJECTION, SOLUTION INTRAVENOUS ONCE
Qty: 0 | Refills: 0 | Status: DISCONTINUED | OUTPATIENT
Start: 2019-03-18 | End: 2019-03-20

## 2019-03-18 RX ORDER — CITRIC ACID/SODIUM CITRATE 300-500 MG
15 SOLUTION, ORAL ORAL EVERY 4 HOURS
Qty: 0 | Refills: 0 | Status: DISCONTINUED | OUTPATIENT
Start: 2019-03-18 | End: 2019-03-20

## 2019-03-18 RX ORDER — BUTORPHANOL TARTRATE 2 MG/ML
2 INJECTION, SOLUTION INTRAMUSCULAR; INTRAVENOUS ONCE
Qty: 0 | Refills: 0 | Status: DISCONTINUED | OUTPATIENT
Start: 2019-03-18 | End: 2019-03-18

## 2019-03-18 RX ORDER — AMPICILLIN TRIHYDRATE 250 MG
2 CAPSULE ORAL ONCE
Qty: 0 | Refills: 0 | Status: COMPLETED | OUTPATIENT
Start: 2019-03-18 | End: 2019-03-18

## 2019-03-18 RX ADMIN — SODIUM CHLORIDE 125 MILLILITER(S): 9 INJECTION, SOLUTION INTRAVENOUS at 16:37

## 2019-03-18 RX ADMIN — Medication 108 GRAM(S): at 20:32

## 2019-03-18 RX ADMIN — Medication 216 GRAM(S): at 16:15

## 2019-03-18 RX ADMIN — BUTORPHANOL TARTRATE 2 MILLIGRAM(S): 2 INJECTION, SOLUTION INTRAMUSCULAR; INTRAVENOUS at 21:53

## 2019-03-18 RX ADMIN — BUTORPHANOL TARTRATE 2 MILLIGRAM(S): 2 INJECTION, SOLUTION INTRAMUSCULAR; INTRAVENOUS at 21:39

## 2019-03-18 RX ADMIN — SODIUM CHLORIDE 125 MILLILITER(S): 9 INJECTION, SOLUTION INTRAVENOUS at 21:39

## 2019-03-19 ENCOUNTER — TRANSCRIPTION ENCOUNTER (OUTPATIENT)
Age: 29
End: 2019-03-19

## 2019-03-19 LAB
GLUCOSE BLDC GLUCOMTR-MCNC: 76 MG/DL — SIGNIFICANT CHANGE UP (ref 70–99)
GLUCOSE BLDC GLUCOMTR-MCNC: 89 MG/DL — SIGNIFICANT CHANGE UP (ref 70–99)
GLUCOSE BLDC GLUCOMTR-MCNC: 96 MG/DL — SIGNIFICANT CHANGE UP (ref 70–99)
GLUCOSE BLDC GLUCOMTR-MCNC: 98 MG/DL — SIGNIFICANT CHANGE UP (ref 70–99)
T PALLIDUM AB TITR SER: NEGATIVE — SIGNIFICANT CHANGE UP

## 2019-03-19 RX ORDER — MORPHINE SULFATE 50 MG/1
4 CAPSULE, EXTENDED RELEASE ORAL ONCE
Qty: 0 | Refills: 0 | Status: DISCONTINUED | OUTPATIENT
Start: 2019-03-19 | End: 2019-03-19

## 2019-03-19 RX ORDER — BUTORPHANOL TARTRATE 2 MG/ML
2 INJECTION, SOLUTION INTRAMUSCULAR; INTRAVENOUS ONCE
Qty: 0 | Refills: 0 | Status: DISCONTINUED | OUTPATIENT
Start: 2019-03-19 | End: 2019-03-19

## 2019-03-19 RX ORDER — OXYTOCIN 10 UNIT/ML
2 VIAL (ML) INJECTION
Qty: 30 | Refills: 0 | Status: DISCONTINUED | OUTPATIENT
Start: 2019-03-19 | End: 2019-03-20

## 2019-03-19 RX ADMIN — Medication 108 GRAM(S): at 04:26

## 2019-03-19 RX ADMIN — Medication 108 GRAM(S): at 12:34

## 2019-03-19 RX ADMIN — Medication 2 MILLIUNIT(S)/MIN: at 16:41

## 2019-03-19 RX ADMIN — Medication 108 GRAM(S): at 20:29

## 2019-03-19 RX ADMIN — BUTORPHANOL TARTRATE 2 MILLIGRAM(S): 2 INJECTION, SOLUTION INTRAMUSCULAR; INTRAVENOUS at 07:36

## 2019-03-19 RX ADMIN — SODIUM CHLORIDE 125 MILLILITER(S): 9 INJECTION, SOLUTION INTRAVENOUS at 07:24

## 2019-03-19 RX ADMIN — MORPHINE SULFATE 4 MILLIGRAM(S): 50 CAPSULE, EXTENDED RELEASE ORAL at 02:30

## 2019-03-19 RX ADMIN — MORPHINE SULFATE 4 MILLIGRAM(S): 50 CAPSULE, EXTENDED RELEASE ORAL at 02:01

## 2019-03-19 RX ADMIN — SODIUM CHLORIDE 125 MILLILITER(S): 9 INJECTION, SOLUTION INTRAVENOUS at 07:25

## 2019-03-19 RX ADMIN — MORPHINE SULFATE 4 MILLIGRAM(S): 50 CAPSULE, EXTENDED RELEASE ORAL at 02:00

## 2019-03-19 RX ADMIN — Medication 108 GRAM(S): at 16:35

## 2019-03-19 RX ADMIN — Medication 108 GRAM(S): at 08:33

## 2019-03-19 RX ADMIN — BUTORPHANOL TARTRATE 2 MILLIGRAM(S): 2 INJECTION, SOLUTION INTRAMUSCULAR; INTRAVENOUS at 07:21

## 2019-03-19 RX ADMIN — Medication 108 GRAM(S): at 00:14

## 2019-03-20 ENCOUNTER — TRANSCRIPTION ENCOUNTER (OUTPATIENT)
Age: 29
End: 2019-03-20

## 2019-03-20 LAB
ALBUMIN SERPL ELPH-MCNC: 2.2 G/DL — LOW (ref 3.3–5)
ALP SERPL-CCNC: 115 U/L — SIGNIFICANT CHANGE UP (ref 40–120)
ALT FLD-CCNC: 7 U/L — SIGNIFICANT CHANGE UP (ref 4–33)
ANION GAP SERPL CALC-SCNC: 11 MMO/L — SIGNIFICANT CHANGE UP (ref 7–14)
APTT BLD: 30.7 SEC — SIGNIFICANT CHANGE UP (ref 27.5–36.3)
AST SERPL-CCNC: 18 U/L — SIGNIFICANT CHANGE UP (ref 4–32)
BILIRUB SERPL-MCNC: 0.4 MG/DL — SIGNIFICANT CHANGE UP (ref 0.2–1.2)
BUN SERPL-MCNC: 6 MG/DL — LOW (ref 7–23)
CALCIUM SERPL-MCNC: 8 MG/DL — LOW (ref 8.4–10.5)
CHLORIDE SERPL-SCNC: 105 MMOL/L — SIGNIFICANT CHANGE UP (ref 98–107)
CO2 SERPL-SCNC: 21 MMOL/L — LOW (ref 22–31)
CREAT SERPL-MCNC: 0.72 MG/DL — SIGNIFICANT CHANGE UP (ref 0.5–1.3)
FIBRINOGEN PPP-MCNC: 594.3 MG/DL — HIGH (ref 350–510)
GLUCOSE BLDC GLUCOMTR-MCNC: 90 MG/DL — SIGNIFICANT CHANGE UP (ref 70–99)
GLUCOSE SERPL-MCNC: 101 MG/DL — HIGH (ref 70–99)
HCT VFR BLD CALC: 29 % — LOW (ref 34.5–45)
HCT VFR BLD CALC: 32.2 % — LOW (ref 34.5–45)
HGB BLD-MCNC: 10.4 G/DL — LOW (ref 11.5–15.5)
HGB BLD-MCNC: 9.3 G/DL — LOW (ref 11.5–15.5)
INR BLD: 1.09 — SIGNIFICANT CHANGE UP (ref 0.88–1.17)
LACTATE SERPL-SCNC: 1.9 MMOL/L — SIGNIFICANT CHANGE UP (ref 0.5–2)
LACTATE SERPL-SCNC: 2.4 MMOL/L — HIGH (ref 0.5–2)
LACTATE SERPL-SCNC: 3.9 MMOL/L — HIGH (ref 0.5–2)
MCHC RBC-ENTMCNC: 28 PG — SIGNIFICANT CHANGE UP (ref 27–34)
MCHC RBC-ENTMCNC: 28.3 PG — SIGNIFICANT CHANGE UP (ref 27–34)
MCHC RBC-ENTMCNC: 32.1 % — SIGNIFICANT CHANGE UP (ref 32–36)
MCHC RBC-ENTMCNC: 32.3 % — SIGNIFICANT CHANGE UP (ref 32–36)
MCV RBC AUTO: 86.8 FL — SIGNIFICANT CHANGE UP (ref 80–100)
MCV RBC AUTO: 88.1 FL — SIGNIFICANT CHANGE UP (ref 80–100)
NRBC # FLD: 0 K/UL — LOW (ref 25–125)
NRBC # FLD: 0 K/UL — LOW (ref 25–125)
PLATELET # BLD AUTO: 188 K/UL — SIGNIFICANT CHANGE UP (ref 150–400)
PLATELET # BLD AUTO: 203 K/UL — SIGNIFICANT CHANGE UP (ref 150–400)
PMV BLD: 10.7 FL — SIGNIFICANT CHANGE UP (ref 7–13)
PMV BLD: 10.8 FL — SIGNIFICANT CHANGE UP (ref 7–13)
POTASSIUM SERPL-MCNC: 3.8 MMOL/L — SIGNIFICANT CHANGE UP (ref 3.5–5.3)
POTASSIUM SERPL-SCNC: 3.8 MMOL/L — SIGNIFICANT CHANGE UP (ref 3.5–5.3)
PROT SERPL-MCNC: 4.4 G/DL — LOW (ref 6–8.3)
PROTHROM AB SERPL-ACNC: 12.1 SEC — SIGNIFICANT CHANGE UP (ref 9.8–13.1)
RBC # BLD: 3.29 M/UL — LOW (ref 3.8–5.2)
RBC # BLD: 3.71 M/UL — LOW (ref 3.8–5.2)
RBC # FLD: 14.7 % — HIGH (ref 10.3–14.5)
RBC # FLD: 14.8 % — HIGH (ref 10.3–14.5)
SODIUM SERPL-SCNC: 137 MMOL/L — SIGNIFICANT CHANGE UP (ref 135–145)
WBC # BLD: 15.27 K/UL — HIGH (ref 3.8–10.5)
WBC # BLD: 15.49 K/UL — HIGH (ref 3.8–10.5)
WBC # FLD AUTO: 15.27 K/UL — HIGH (ref 3.8–10.5)
WBC # FLD AUTO: 15.49 K/UL — HIGH (ref 3.8–10.5)

## 2019-03-20 PROCEDURE — 59510 CESAREAN DELIVERY: CPT | Mod: U9,UB,GC

## 2019-03-20 RX ORDER — DOCUSATE SODIUM 100 MG
100 CAPSULE ORAL
Qty: 0 | Refills: 0 | Status: DISCONTINUED | OUTPATIENT
Start: 2019-03-20 | End: 2019-03-23

## 2019-03-20 RX ORDER — KETOROLAC TROMETHAMINE 30 MG/ML
30 SYRINGE (ML) INJECTION EVERY 6 HOURS
Qty: 0 | Refills: 0 | Status: DISCONTINUED | OUTPATIENT
Start: 2019-03-20 | End: 2019-03-20

## 2019-03-20 RX ORDER — OXYCODONE HYDROCHLORIDE 5 MG/1
5 TABLET ORAL
Qty: 0 | Refills: 0 | Status: DISCONTINUED | OUTPATIENT
Start: 2019-03-20 | End: 2019-03-21

## 2019-03-20 RX ORDER — OXYCODONE HYDROCHLORIDE 5 MG/1
10 TABLET ORAL
Qty: 0 | Refills: 0 | Status: DISCONTINUED | OUTPATIENT
Start: 2019-03-20 | End: 2019-03-21

## 2019-03-20 RX ORDER — DIPHENHYDRAMINE HCL 50 MG
25 CAPSULE ORAL EVERY 6 HOURS
Qty: 0 | Refills: 0 | Status: DISCONTINUED | OUTPATIENT
Start: 2019-03-20 | End: 2019-03-23

## 2019-03-20 RX ORDER — SODIUM CHLORIDE 9 MG/ML
1000 INJECTION, SOLUTION INTRAVENOUS ONCE
Qty: 0 | Refills: 0 | Status: COMPLETED | OUTPATIENT
Start: 2019-03-20 | End: 2019-03-20

## 2019-03-20 RX ORDER — LANOLIN
1 OINTMENT (GRAM) TOPICAL
Qty: 0 | Refills: 0 | Status: DISCONTINUED | OUTPATIENT
Start: 2019-03-20 | End: 2019-03-23

## 2019-03-20 RX ORDER — NALOXONE HYDROCHLORIDE 4 MG/.1ML
0.1 SPRAY NASAL
Qty: 0 | Refills: 0 | Status: DISCONTINUED | OUTPATIENT
Start: 2019-03-20 | End: 2019-03-23

## 2019-03-20 RX ORDER — METOCLOPRAMIDE HCL 10 MG
10 TABLET ORAL ONCE
Qty: 0 | Refills: 0 | Status: COMPLETED | OUTPATIENT
Start: 2019-03-20 | End: 2019-03-20

## 2019-03-20 RX ORDER — IBUPROFEN 200 MG
600 TABLET ORAL EVERY 6 HOURS
Qty: 0 | Refills: 0 | Status: DISCONTINUED | OUTPATIENT
Start: 2019-03-20 | End: 2019-03-20

## 2019-03-20 RX ORDER — OXYTOCIN 10 UNIT/ML
41.67 VIAL (ML) INJECTION
Qty: 20 | Refills: 0 | Status: DISCONTINUED | OUTPATIENT
Start: 2019-03-20 | End: 2019-03-21

## 2019-03-20 RX ORDER — OXYCODONE HYDROCHLORIDE 5 MG/1
5 TABLET ORAL EVERY 4 HOURS
Qty: 0 | Refills: 0 | Status: DISCONTINUED | OUTPATIENT
Start: 2019-03-20 | End: 2019-03-20

## 2019-03-20 RX ORDER — FAMOTIDINE 10 MG/ML
20 INJECTION INTRAVENOUS ONCE
Qty: 0 | Refills: 0 | Status: COMPLETED | OUTPATIENT
Start: 2019-03-20 | End: 2019-03-20

## 2019-03-20 RX ORDER — CITRIC ACID/SODIUM CITRATE 300-500 MG
30 SOLUTION, ORAL ORAL ONCE
Qty: 0 | Refills: 0 | Status: COMPLETED | OUTPATIENT
Start: 2019-03-20 | End: 2019-03-20

## 2019-03-20 RX ORDER — SODIUM CHLORIDE 9 MG/ML
500 INJECTION INTRAMUSCULAR; INTRAVENOUS; SUBCUTANEOUS ONCE
Qty: 0 | Refills: 0 | Status: COMPLETED | OUTPATIENT
Start: 2019-03-20 | End: 2019-03-20

## 2019-03-20 RX ORDER — SIMETHICONE 80 MG/1
80 TABLET, CHEWABLE ORAL EVERY 4 HOURS
Qty: 0 | Refills: 0 | Status: DISCONTINUED | OUTPATIENT
Start: 2019-03-20 | End: 2019-03-23

## 2019-03-20 RX ORDER — ONDANSETRON 8 MG/1
4 TABLET, FILM COATED ORAL EVERY 6 HOURS
Qty: 0 | Refills: 0 | Status: DISCONTINUED | OUTPATIENT
Start: 2019-03-20 | End: 2019-03-23

## 2019-03-20 RX ORDER — HYDROMORPHONE HYDROCHLORIDE 2 MG/ML
1 INJECTION INTRAMUSCULAR; INTRAVENOUS; SUBCUTANEOUS EVERY 6 HOURS
Qty: 0 | Refills: 0 | Status: DISCONTINUED | OUTPATIENT
Start: 2019-03-20 | End: 2019-03-21

## 2019-03-20 RX ORDER — IBUPROFEN 200 MG
1 TABLET ORAL
Qty: 0 | Refills: 0 | COMMUNITY

## 2019-03-20 RX ORDER — SODIUM CHLORIDE 9 MG/ML
1000 INJECTION INTRAMUSCULAR; INTRAVENOUS; SUBCUTANEOUS
Qty: 0 | Refills: 0 | Status: DISCONTINUED | OUTPATIENT
Start: 2019-03-20 | End: 2019-03-20

## 2019-03-20 RX ORDER — HEPARIN SODIUM 5000 [USP'U]/ML
5000 INJECTION INTRAVENOUS; SUBCUTANEOUS EVERY 12 HOURS
Qty: 0 | Refills: 0 | Status: DISCONTINUED | OUTPATIENT
Start: 2019-03-20 | End: 2019-03-23

## 2019-03-20 RX ORDER — OXYTOCIN 10 UNIT/ML
333.33 VIAL (ML) INJECTION
Qty: 20 | Refills: 0 | Status: DISCONTINUED | OUTPATIENT
Start: 2019-03-20 | End: 2019-03-21

## 2019-03-20 RX ORDER — FERROUS SULFATE 325(65) MG
325 TABLET ORAL DAILY
Qty: 0 | Refills: 0 | Status: DISCONTINUED | OUTPATIENT
Start: 2019-03-20 | End: 2019-03-23

## 2019-03-20 RX ORDER — ACETAMINOPHEN 500 MG
975 TABLET ORAL EVERY 6 HOURS
Qty: 0 | Refills: 0 | Status: DISCONTINUED | OUTPATIENT
Start: 2019-03-20 | End: 2019-03-20

## 2019-03-20 RX ORDER — SODIUM CHLORIDE 9 MG/ML
1000 INJECTION, SOLUTION INTRAVENOUS
Qty: 0 | Refills: 0 | Status: DISCONTINUED | OUTPATIENT
Start: 2019-03-20 | End: 2019-03-21

## 2019-03-20 RX ORDER — GLYCERIN ADULT
1 SUPPOSITORY, RECTAL RECTAL AT BEDTIME
Qty: 0 | Refills: 0 | Status: DISCONTINUED | OUTPATIENT
Start: 2019-03-20 | End: 2019-03-23

## 2019-03-20 RX ORDER — KETOROLAC TROMETHAMINE 30 MG/ML
30 SYRINGE (ML) INJECTION EVERY 6 HOURS
Qty: 0 | Refills: 0 | Status: DISCONTINUED | OUTPATIENT
Start: 2019-03-20 | End: 2019-03-21

## 2019-03-20 RX ORDER — OXYTOCIN 10 UNIT/ML
41.67 VIAL (ML) INJECTION
Qty: 20 | Refills: 0 | Status: DISCONTINUED | OUTPATIENT
Start: 2019-03-20 | End: 2019-03-20

## 2019-03-20 RX ORDER — SODIUM CHLORIDE 9 MG/ML
500 INJECTION, SOLUTION INTRAVENOUS ONCE
Qty: 0 | Refills: 0 | Status: COMPLETED | OUTPATIENT
Start: 2019-03-20 | End: 2019-03-20

## 2019-03-20 RX ORDER — TETANUS TOXOID, REDUCED DIPHTHERIA TOXOID AND ACELLULAR PERTUSSIS VACCINE, ADSORBED 5; 2.5; 8; 8; 2.5 [IU]/.5ML; [IU]/.5ML; UG/.5ML; UG/.5ML; UG/.5ML
0.5 SUSPENSION INTRAMUSCULAR ONCE
Qty: 0 | Refills: 0 | Status: DISCONTINUED | OUTPATIENT
Start: 2019-03-20 | End: 2019-03-23

## 2019-03-20 RX ORDER — SODIUM CHLORIDE 9 MG/ML
1000 INJECTION, SOLUTION INTRAVENOUS
Qty: 0 | Refills: 0 | Status: DISCONTINUED | OUTPATIENT
Start: 2019-03-20 | End: 2019-03-20

## 2019-03-20 RX ORDER — OXYCODONE HYDROCHLORIDE 5 MG/1
5 TABLET ORAL
Qty: 0 | Refills: 0 | Status: DISCONTINUED | OUTPATIENT
Start: 2019-03-20 | End: 2019-03-20

## 2019-03-20 RX ADMIN — SODIUM CHLORIDE 2000 MILLILITER(S): 9 INJECTION, SOLUTION INTRAVENOUS at 07:30

## 2019-03-20 RX ADMIN — Medication 125 MILLIUNIT(S)/MIN: at 06:24

## 2019-03-20 RX ADMIN — SIMETHICONE 80 MILLIGRAM(S): 80 TABLET, CHEWABLE ORAL at 23:14

## 2019-03-20 RX ADMIN — Medication 30 MILLIGRAM(S): at 17:54

## 2019-03-20 RX ADMIN — Medication 30 MILLIGRAM(S): at 13:50

## 2019-03-20 RX ADMIN — Medication 25 MILLIGRAM(S): at 18:58

## 2019-03-20 RX ADMIN — Medication 1 TABLET(S): at 23:14

## 2019-03-20 RX ADMIN — SODIUM CHLORIDE 75 MILLILITER(S): 9 INJECTION, SOLUTION INTRAVENOUS at 06:24

## 2019-03-20 RX ADMIN — Medication 30 MILLIGRAM(S): at 17:51

## 2019-03-20 RX ADMIN — Medication 325 MILLIGRAM(S): at 23:14

## 2019-03-20 RX ADMIN — Medication 30 MILLILITER(S): at 04:20

## 2019-03-20 RX ADMIN — FAMOTIDINE 20 MILLIGRAM(S): 10 INJECTION INTRAVENOUS at 04:20

## 2019-03-20 RX ADMIN — Medication 100 MILLIGRAM(S): at 23:15

## 2019-03-20 RX ADMIN — Medication 10 MILLIGRAM(S): at 04:20

## 2019-03-20 RX ADMIN — Medication 30 MILLIGRAM(S): at 23:14

## 2019-03-20 RX ADMIN — Medication 30 MILLIGRAM(S): at 13:35

## 2019-03-20 RX ADMIN — SODIUM CHLORIDE 1000 MILLILITER(S): 9 INJECTION INTRAMUSCULAR; INTRAVENOUS; SUBCUTANEOUS at 03:10

## 2019-03-20 RX ADMIN — SODIUM CHLORIDE 1000 MILLILITER(S): 9 INJECTION, SOLUTION INTRAVENOUS at 10:05

## 2019-03-20 RX ADMIN — Medication 108 GRAM(S): at 00:30

## 2019-03-20 RX ADMIN — HEPARIN SODIUM 5000 UNIT(S): 5000 INJECTION INTRAVENOUS; SUBCUTANEOUS at 13:36

## 2019-03-20 NOTE — CHART NOTE - NSCHARTNOTEFT_GEN_A_CORE
R1 Event Note    Patient seen at bedside for eye swelling. Patient reports that she was rubbing her eyes in PACU and was noted to have periorbital edema by the RN when she came to the postpartum floor. Pt had received toradol when it was noted but reports that she has taken NSAIDs in the past without adverse reaction. Pt is otherwise feeling well, tolerating regular diet, not yet passing gas. Indwelling catheter in place.     Vital Signs Last 24 Hrs  T(C): 37.5 (20 Mar 2019 18:12), Max: 37.5 (20 Mar 2019 14:50)  T(F): 99.5 (20 Mar 2019 18:12), Max: 99.5 (20 Mar 2019 14:50)  HR: 100 (20 Mar 2019 18:12) (94 - 121)  BP: 115/72 (20 Mar 2019 18:12) (99/55 - 121/92)  BP(mean): 77 (20 Mar 2019 12:30) (63 - 99)  RR: 18 (20 Mar 2019 18:12) (16 - 20)  SpO2: 100% (20 Mar 2019 18:12) (97% - 100%)    PE:  Gen: In bed eating, NAD  Eyes: Periorbital edema, non-tender    Lactate, Blood: 1.9 mmol/L (03.19 @ 18:30)      P:  28y  s/p pLTCS for arrest [id number] w/ periorbital edema. No h/o allergies. Reports feeling well at this time.    -Benadryl  -Cold compress for eyes  -Remove indwelling catheter. DTV    D/w Dr. Adelia Reich, PGY-1

## 2019-03-20 NOTE — CHART NOTE - NSCHARTNOTEFT_GEN_A_CORE
Att MFM  Pat S/P CS for arrest of labor; EBL 1200cc  Post-op pat Became tachycardic 110-120  Rpt CBc Hct 32%  Lactate 3.6  Pat apears clinically well; FAST exam Neg for free fluid  Good U/O  Plan IV bolus 500cc  Repeat Lactate

## 2019-03-20 NOTE — DISCHARGE NOTE OB - CARE PROVIDER_API CALL
Radha Elder (MD)  Obstetrics and Gynecology  [facility] [place] [address]  Phone: [phone]  Fax: [phone]  Follow Up Time:

## 2019-03-20 NOTE — DISCHARGE NOTE OB - HOSPITAL COURSE
Patient admitted for induction of labor for polyhydramnios and GDMA1.  REceived PO cytotec and pitocin. Progressed to 3.5/90/-2 and had adequate contractions for almost 12 hours on pitocin and made no change.  section performed

## 2019-03-20 NOTE — CHART NOTE - NSCHARTNOTEFT_GEN_A_CORE
Called to see patient notified that since patient arrived in Pacu  tachycardic to 120 (  and hypotensive BP /50    pt denied any complaints  denied any cardiac disease    pt was CS for arrest with EBL 1200cc received 2500cc in OR and UOP has been adequate       pt seen and examined    pt alert  in NAD    lungs clear   heart S1S2    abd soft firm fundus     lochia scant         Event :    Vital Signs Last 24 Hrs  T(C): 36.7 (20 Mar 2019 06:20), Max: 36.7 (20 Mar 2019 06:20)  T(F): 98.1 (20 Mar 2019 06:20), Max: 98.1 (20 Mar 2019 06:20)  HR: 121 (20 Mar 2019 07:00) (102 - 121)  BP: 100/62 (20 Mar 2019 07:00) (100/62 - 121/92)  BP(mean): 68 (20 Mar 2019 07:00) (67 - 99)  RR: 20 (20 Mar 2019 07:00) (17 - 20)  SpO2: 97% (20 Mar 2019 07:00) (97% - 99%)    I&O's Detail    19 Mar 2019 07:01  -  20 Mar 2019 07:00  --------------------------------------------------------  IN:    dextrose 5% + lactated ringers.: 2500 mL    Lactated Ringers IV Bolus: 1000 mL    Other: 2000 mL  Total IN: 5500 mL    OUT:    Estimated Blood Loss: 1200 mL    Indwelling Catheter - Urethral: 3725 mL  Total OUT: 4925 mL    Total NET: 575 mL          Labs:                        11.7   11.07 )-----------( 213      ( 18 Mar 2019 17:15 )             36.5             Fibrinogen:     Lactate:     MEDICATIONS  (STANDING):  lactated ringers Bolus 1000 milliLiter(s) IV Bolus once  lactated ringers. 1000 milliLiter(s) (75 mL/Hr) IV Continuous <Continuous>  oxytocin Infusion 41.667 milliUNIT(s)/Min (125 mL/Hr) IV Continuous <Continuous>  oxytocin Infusion 333.333 milliUNIT(s)/Min (1000 mL/Hr) IV Continuous <Continuous>  oxytocin Infusion 41.667 milliUNIT(s)/Min (125 mL/Hr) IV Continuous <Continuous>      Evaluation : stable      Differential Dg :S/P  CS   EBL 1200cc    hypotensive /tachycardic in PACU   r/o hypovolemia      Management and Follow-up plan :   continued observation and will send labs    case d/w Dr Adelia EMERY                  -------------------------------------------------------------------------------------------------------------

## 2019-03-20 NOTE — CHART NOTE - NSCHARTNOTEFT_GEN_A_CORE
Patient is a 27yo female s/p  this morning. Tachycardic and hypotensive post operatively but improving in L&D PACU. Surgical Rapid Response called for increased lactate. On arrival, patient feeling well with minimal discomfort at surgical site. Denies HA, dizziness, CP, SOB, nausea, and vomiting.    T(C): , Max: 36.7 (19 @ 06:20)  HR: 97 (19 @ 12:00) (94 - 121)  BP: 107/65 (19 @ 12:00) (99/55 - 121/92)  RR: 20 (19 @ 12:00) (16 - 20)  SpO2: 97% (19 @ 12:00) (97% - 99%)  Wt(kg): --    I&O's Summary    19 Mar 2019 07:  -  20 Mar 2019 07:00  --------------------------------------------------------  IN: 5500 mL / OUT: 4925 mL / NET: 575 mL    20 Mar 2019 07:01  -  20 Mar 2019 12:19  --------------------------------------------------------  IN: 0 mL / OUT: 185 mL / NET: -185 mL      PHYSICAL EXAM:   Constitutional: Well-developed, well-nourished, NAD.  Neuro: AOx3, no focal deficits  Respiratory:  Lungs CTA, B/L, no rales , no wheezing, no rhonchi.  Cardiovascular:  S1, S2, Reg rate  Gastrointestinal: Abdomen soft, distended uterus, appropriately tender.   Extremities: No edema, no calf tenderness. NV intact.     LABS:                      9.3    15.27 )-----------( 188      ( 20 Mar 2019 11:00 )             29.0         03    137  |  105  |  6<L>  ----------------------------<  101<H>  3.8   |  21<L>  |  0.72    Ca    8.0<L>      20 Mar 2019 08:14    TPro  4.4<L>  /  Alb  2.2<L>  /  TBili  0.4  /  DBili  x   /  AST  18  /  ALT  7   /  AlkPhos  115  03-20      A/P: 28F s/p , POD #0 - doing well clinically  - pain control   - IVF  - trend lactate         Elizabeth Armendariz PA-C   [id number]

## 2019-03-20 NOTE — PROVIDER CONTACT NOTE (OTHER) - SITUATION
patient complains of feeling her eye lids swollen now, denies any shortness of breath , no rash noted

## 2019-03-20 NOTE — CHART NOTE - NSCHARTNOTEFT_GEN_A_CORE
PA NOTE      Patient is 28y  old. female S/P CS for arrest today at 5am  EBL 1200 cc    pt initially seen in PACU at 730a for episodes of Hypotension/tachycardia  which resolved with IVH and pt was always clinically stable and denied any complaints of Dizziness headaches CP SOB  Palpitations  nausea emesis    labs were sent and findings were reviewed and elevated lactate level  3.9 and SSRT was called as per protocol     pt seen and examined with Dr Fleischer Dr Traugott and  SRRT team members        Event : elevated lactate level post op   resolution of tachycardia and hypotension       Vital Signs Last 24 Hrs  T(C): 36.7 (20 Mar 2019 07:15), Max: 36.7 (20 Mar 2019 06:20)  T(F): 98.1 (20 Mar 2019 07:15), Max: 98.1 (20 Mar 2019 06:20)  HR: 102 (20 Mar 2019 10:30) (94 - 121)  BP: 114/70 (20 Mar 2019 10:30) (99/55 - 121/92)  BP(mean): 78 (20 Mar 2019 10:30) (63 - 99)  RR: 20 (20 Mar 2019 10:30) (16 - 20)  SpO2: 99% (20 Mar 2019 10:30) (97% - 99%)    I&O's Detail    19 Mar 2019 07:01  -  20 Mar 2019 07:00  --------------------------------------------------------  IN:    dextrose 5% + lactated ringers.: 2500 mL    Lactated Ringers IV Bolus: 1000 mL    Other: 2000 mL  Total IN: 5500 mL    OUT:    Estimated Blood Loss: 1200 mL    Indwelling Catheter - Urethral: 3725 mL  Total OUT: 4925 mL    Total NET: 575 mL      20 Mar 2019 07:01  -  20 Mar 2019 10:54  --------------------------------------------------------  IN:  Total IN: 0 mL    OUT:    Indwelling Catheter - Urethral: 185 mL  Total OUT: 185 mL    Total NET: -185 mL          Labs:                        10.4   15.49 )-----------( 203      ( 20 Mar 2019 08:14 )             32.2                         11.7   11.07 )-----------( 213      ( 18 Mar 2019 17:15 )             36.5         PT/INR - ( 20 Mar 2019 08:14 )   PT: 12.1 SEC;   INR: 1.09          PTT - ( 20 Mar 2019 08:14 )  PTT:30.7 SEC    Fibrinogen: Fibrinogen Assay: 594.3 mg/dL (03-20 @ 08:14)      Lactate: Lactate, Blood: 3.9 mmol/L (03-20 @ 08:14)      MEDICATIONS  (STANDING):  lactated ringers. 1000 milliLiter(s) (75 mL/Hr) IV Continuous <Continuous>  oxytocin Infusion 333.333 milliUNIT(s)/Min (1000 mL/Hr) IV Continuous <Continuous>  oxytocin Infusion 41.667 milliUNIT(s)/Min (125 mL/Hr) IV Continuous <Continuous>      Evaluation : Fast scan negative by Dr Fleischer     Differential Dg : r/o hypovolemia    r/o sepsis doubtful    r/o hemorrhage doubtful     Management and Follow-up plan :   continued observation and monitoring    IVH bolus 500cc    repeat CBC Lactate level at 1030a   case d/w Dr Adelia EMERY             -------------------------------------------------------------------------------------------------------------

## 2019-03-20 NOTE — DISCHARGE NOTE OB - PATIENT PORTAL LINK FT
You can access the [facility] Portal, offered by [facility], by registering with the following website: [email or web address]

## 2019-03-20 NOTE — DISCHARGE NOTE OB - CARE PLAN
Principal Discharge DX:	Arrest of dilation, delivered, current hospitalization  Goal:	routine postop recovery  Assessment and plan of treatment:	pelvic rest x 6 weeks

## 2019-03-20 NOTE — DISCHARGE NOTE OB - MEDICATION SUMMARY - MEDICATIONS TO STOP TAKING
I will STOP taking the medications listed below when I get home from the hospital:    metFORMIN  --  by mouth

## 2019-03-20 NOTE — DISCHARGE NOTE OB - MEDICATION SUMMARY - MEDICATIONS TO TAKE
I will START or STAY ON the medications listed below when I get home from the hospital:    ibuprofen 600 mg oral tablet  -- 1 tab(s) by mouth every 6 hours  -- Indication: For Gestational diabetes mellitus (GDM)

## 2019-03-21 LAB
HCT VFR BLD CALC: 27 % — LOW (ref 34.5–45)
HGB BLD-MCNC: 8.6 G/DL — LOW (ref 11.5–15.5)
MCHC RBC-ENTMCNC: 28.2 PG — SIGNIFICANT CHANGE UP (ref 27–34)
MCHC RBC-ENTMCNC: 31.9 % — LOW (ref 32–36)
MCV RBC AUTO: 88.5 FL — SIGNIFICANT CHANGE UP (ref 80–100)
NRBC # FLD: 0 K/UL — LOW (ref 25–125)
PLATELET # BLD AUTO: 202 K/UL — SIGNIFICANT CHANGE UP (ref 150–400)
PMV BLD: 10.2 FL — SIGNIFICANT CHANGE UP (ref 7–13)
RBC # BLD: 3.05 M/UL — LOW (ref 3.8–5.2)
RBC # FLD: 14.9 % — HIGH (ref 10.3–14.5)
WBC # BLD: 16.28 K/UL — HIGH (ref 3.8–10.5)
WBC # FLD AUTO: 16.28 K/UL — HIGH (ref 3.8–10.5)

## 2019-03-21 RX ORDER — IBUPROFEN 200 MG
600 TABLET ORAL EVERY 6 HOURS
Qty: 0 | Refills: 0 | Status: DISCONTINUED | OUTPATIENT
Start: 2019-03-21 | End: 2019-03-23

## 2019-03-21 RX ORDER — ACETAMINOPHEN 500 MG
975 TABLET ORAL EVERY 6 HOURS
Qty: 0 | Refills: 0 | Status: DISCONTINUED | OUTPATIENT
Start: 2019-03-21 | End: 2019-03-23

## 2019-03-21 RX ADMIN — Medication 1 TABLET(S): at 12:59

## 2019-03-21 RX ADMIN — Medication 30 MILLIGRAM(S): at 06:00

## 2019-03-21 RX ADMIN — Medication 600 MILLIGRAM(S): at 13:30

## 2019-03-21 RX ADMIN — Medication 975 MILLIGRAM(S): at 13:30

## 2019-03-21 RX ADMIN — Medication 600 MILLIGRAM(S): at 18:52

## 2019-03-21 RX ADMIN — Medication 975 MILLIGRAM(S): at 18:03

## 2019-03-21 RX ADMIN — HEPARIN SODIUM 5000 UNIT(S): 5000 INJECTION INTRAVENOUS; SUBCUTANEOUS at 18:04

## 2019-03-21 RX ADMIN — SIMETHICONE 80 MILLIGRAM(S): 80 TABLET, CHEWABLE ORAL at 18:05

## 2019-03-21 RX ADMIN — HEPARIN SODIUM 5000 UNIT(S): 5000 INJECTION INTRAVENOUS; SUBCUTANEOUS at 02:20

## 2019-03-21 RX ADMIN — Medication 30 MILLIGRAM(S): at 06:53

## 2019-03-21 RX ADMIN — Medication 30 MILLIGRAM(S): at 00:00

## 2019-03-21 RX ADMIN — Medication 100 MILLIGRAM(S): at 13:00

## 2019-03-21 RX ADMIN — Medication 600 MILLIGRAM(S): at 12:58

## 2019-03-21 RX ADMIN — Medication 325 MILLIGRAM(S): at 12:58

## 2019-03-21 RX ADMIN — SIMETHICONE 80 MILLIGRAM(S): 80 TABLET, CHEWABLE ORAL at 13:00

## 2019-03-21 RX ADMIN — Medication 600 MILLIGRAM(S): at 18:04

## 2019-03-21 RX ADMIN — Medication 100 MILLIGRAM(S): at 18:04

## 2019-03-21 RX ADMIN — Medication 975 MILLIGRAM(S): at 18:52

## 2019-03-21 RX ADMIN — Medication 975 MILLIGRAM(S): at 12:58

## 2019-03-21 NOTE — PROGRESS NOTE ADULT - SUBJECTIVE AND OBJECTIVE BOX
ANESTHESIA POSTOP CHECK    28y Female POSTOP DAY 1     No COMPLAINTS    NO APPARENT ANESTHESIA COMPLICATIONS

## 2019-03-21 NOTE — LACTATION INITIAL EVALUATION - PRENATAL BREAST CHANGES
Cardiac Rehabilitation Home Exercise Prescription for Duane Verba    Goals identified on first session visit:  Increase endurance  Increase muscle strength  Return to daily activities  Lose weight    Initial Measurements:  Waist: 38.5 inches   Weight: 180.1 lbs  BMI: 27.44  2nd visit MET Level: 3.3 METs  Initial Peak MET level: 4.5 METs    Midpoint Measurements:  Waist: 39 inches  Weight: 180.4 lbs  BMI: 27.54  Current MET Level: 3.4 METs    Body mass index (BMI) is a measure of body fat based on height and weight that applies to adult men and women.     BMI Categories:   Underweight = <18.5  Normal weight = 18.5-24.9  Overweight = 25-29.9  Obesity = BMI of 30 or greater    What is a MET?  Metabolic Equivaelncy Table: Activities are categorized by the amount of energy it takes to perform them.  This energy level is measured in \"METs\".  One MET equals the amount of energy your body uses at rest and increases as you activity level increases.  For example: Sitting in a chair is 1 MET, doing laundry is 3 METs, 3mph/2% incline on a treadmill is 4 METs.        It is very beneficial for you to exercise at home on a regular basis.  The American Heart Association suggests at least 150 minutes per week of moderate exercise.  If you are interested in losing weight, 300 minutes or more per week of moderate exercise may be necessary.  Below is a list of guidelines for you to follow when exercising.    Warm-Up  The exercise session should always begin with at least 3-5 minutes of warming up.  This means starting out the aerobic exercise phase at a slow pace, gradually increasing the speed or intensity.    Aerobic Exercise  This phase consists of continuous, purposeful activity using larger muscle groups to elevate heart rate to a goal range.  [facility] exercise settings.    Frequency:    Complete 5 exercise sessions per week (including cardiac rehab   sessions).    Intensity:        Exercise at a perceived exertion  level of moderate (3) to somewhat hard (4).                                  Exercise at a target heart rate of 30-50 beats above rest.    You should be able to hold a conversation during exercise without becoming excessively short of breath.    Your exercise should never feel \"easy.\" Once your current exercise begins to feel \"easy\" increase the speed/incline/level.    Time:              Exercise for 30+ minutes per session. If your goal is weight loss increase to 45-60 minutes per session.    Type:              Treadmill, Matthew, Bike, Arm Ergometer, Walking     Intervals  You should incorporate intervals into your exercise routine during the week. This means alternating between periods of high-intensity exercise and low-intensity recovery. These intervals can be variations in speed, level or incline. Research shows that interval training increases fitness and burns more calories over a short period of time than steady-state cardio.    Cool-Down  The exercise session should always end with at least 2-5 minutes of cooling down.  This means ending the aerobic exercise at a slow pace, gradually decreasing speed or intensity.  This should be followed by stretching to increase flexibility.  Hold each stretch for 15-30 seconds.    Strength Training  Complete 2-3 strengthening sessions per week.  Complete 1-2 set(s) of 10-12 repetitions.  Increase weight/intensity when able to perform 15 repetitions comfortably.  Make sure to not lift using the same muscle groups on consecutive days, it is important to give your muscles a day to recover. Continue to increase your weights as they start to become \"easy.\" For weight loss it is important to increase your muscle mass so that you increase your basal metabolic rate, this will cause you to burn more calories as rest.    Always listen to your body!  During exercise, if you experience any of the following Signs and Symptoms STOP exercising and alert your physician:  Chest discomfort  (that may spread to other areas of the body), lightheadedness/dizziness, unusual shortness of breath, unusual fatigue, excessive sweating, or nausea/vomiting.    Special Weather Considerations:  Avoid exercise outdoors if the temperature is less than 20 degrees or more than 85 degrees.  Remember to factor in wind chills and heat indexes.    You may be receiving a [facility] digital Outpatient Services Survey in the mail. Please consider taking the time to complete it as your feedback is important to us in order to make improvements in our program. We always strive to provide very good care.    Congratulations! You are half way through the Phase 2 Cardiac Rehab program. Please let me know if there is any additional information or resources I can provide you with. And as always, please let me know if you have any questions or concerns. I hope the program has been excellent for you thus far and I look forward to helping you continue to work towards your goals! -Charito Mcrae     nipple/areola darkened and large/breast heaviness

## 2019-03-21 NOTE — PROGRESS NOTE ADULT - ASSESSMENT
A/P: 29yo  POD#1 s/p pLTCS for arrest c/b hypotension and tachycardia on PPD#0 which has now resolved. EBL 1200 with appropriate drop in Hct.   Patient is stable and doing well post-operatively.

## 2019-03-21 NOTE — PROGRESS NOTE ADULT - SUBJECTIVE AND OBJECTIVE BOX
OB Progress Note:  Delivery, POD#1    S: 27yo  POD#1 s/p pLTCS for arrest. RRT was called yesterday due to hypotension, tachycardia and elevated lactate which has now normalized. Her pain is well controlled. She is tolerating a regular diet and passing flatus. Denies N/V. Denies CP/SOB/lightheadedness/dizziness.   She is ambulating without difficulty.   Voiding spontaneously.     O:   Vital Signs Last 24 Hrs  T(C): 37.4 (21 Mar 2019 06:00), Max: 37.5 (20 Mar 2019 14:50)  T(F): 99.4 (21 Mar 2019 06:00), Max: 99.5 (20 Mar 2019 14:50)  HR: 99 (21 Mar 2019 06:00) (94 - 106)  BP: 124/76 (21 Mar 2019 06:00) (99/55 - 124/76)  BP(mean): 77 (20 Mar 2019 12:30) (63 - 78)  RR: 16 (21 Mar 2019 06:00) (16 - 20)  SpO2: 98% (21 Mar 2019 06:00) (97% - 100%)    Labs:  Blood type: B Positive  Rubella IgG: RPR: Negative                          8.6<L>   16.28<H> >-----------< 202    (  @ 06:15 )             27.0<L>                        9.3<L>   15.27<H> >-----------< 188    (  @ 11:00 )             29.0<L>                        10.4<L>   15.49<H> >-----------< 203    (  @ 08:14 )             32.2<L>                        11.7   11.07<H> >-----------< 213    (  @ 17:15 )             36.5    - @ 08:14      137  |  105  |  6<L>  ----------------------------<  101<H>  3.8   |  21<L>  |  0.72        Ca    8.0<L>      20 Mar 2019 08:14    TPro  4.4<L>  /  Alb  2.2<L>  /  TBili  0.4  /  DBili  x   /  AST  18  /  ALT  7   /  AlkPhos  115  19 @ 08:14      PE:  General: NAD  Abdomen: Mildly distended, appropriately tender, incision c/d/i.  Extremities: No erythema, no pitting edema

## 2019-03-21 NOTE — LACTATION INITIAL EVALUATION - LACTATION INTERVENTIONS
Assisted with positioning to facilitate deep latch.  Encouraged to feed on cue at least 8-12times in 24 hours and follow the feeding log, reviewed.  Taught hand expression.  Discussed possible initiation of dual electric pump routine after each breastfeeding to stimulate milk production.  Encouraged to call for assistance, as needed.  Reviewed outpatient resources available, warm line, breastfeeding support group.  Primary RN made aware of consult./initiate skin to skin/initiate hand expression routine

## 2019-03-21 NOTE — PROGRESS NOTE ADULT - PROBLEM SELECTOR PLAN 1
- Continue regular diet.  - Increase ambulation.  - Continue motrin, tylenol, oxycodone PRN for pain control.       Kalyn Mejia PGY-1  Pager #: [phone]

## 2019-03-22 RX ORDER — OXYCODONE HYDROCHLORIDE 5 MG/1
5 TABLET ORAL
Qty: 0 | Refills: 0 | Status: DISCONTINUED | OUTPATIENT
Start: 2019-03-22 | End: 2019-03-23

## 2019-03-22 RX ORDER — OXYCODONE HYDROCHLORIDE 5 MG/1
5 TABLET ORAL EVERY 4 HOURS
Qty: 0 | Refills: 0 | Status: DISCONTINUED | OUTPATIENT
Start: 2019-03-22 | End: 2019-03-23

## 2019-03-22 RX ORDER — OXYCODONE HYDROCHLORIDE 5 MG/1
5 TABLET ORAL
Qty: 0 | Refills: 0 | Status: COMPLETED | OUTPATIENT
Start: 2019-03-22 | End: 2019-03-29

## 2019-03-22 RX ADMIN — Medication 600 MILLIGRAM(S): at 01:00

## 2019-03-22 RX ADMIN — Medication 975 MILLIGRAM(S): at 05:57

## 2019-03-22 RX ADMIN — HEPARIN SODIUM 5000 UNIT(S): 5000 INJECTION INTRAVENOUS; SUBCUTANEOUS at 05:56

## 2019-03-22 RX ADMIN — Medication 600 MILLIGRAM(S): at 19:41

## 2019-03-22 RX ADMIN — Medication 975 MILLIGRAM(S): at 01:00

## 2019-03-22 RX ADMIN — HEPARIN SODIUM 5000 UNIT(S): 5000 INJECTION INTRAVENOUS; SUBCUTANEOUS at 18:54

## 2019-03-22 RX ADMIN — Medication 600 MILLIGRAM(S): at 12:38

## 2019-03-22 RX ADMIN — Medication 600 MILLIGRAM(S): at 06:42

## 2019-03-22 RX ADMIN — OXYCODONE HYDROCHLORIDE 5 MILLIGRAM(S): 5 TABLET ORAL at 04:00

## 2019-03-22 RX ADMIN — Medication 600 MILLIGRAM(S): at 00:09

## 2019-03-22 RX ADMIN — OXYCODONE HYDROCHLORIDE 5 MILLIGRAM(S): 5 TABLET ORAL at 16:07

## 2019-03-22 RX ADMIN — Medication 600 MILLIGRAM(S): at 11:39

## 2019-03-22 RX ADMIN — SIMETHICONE 80 MILLIGRAM(S): 80 TABLET, CHEWABLE ORAL at 11:38

## 2019-03-22 RX ADMIN — OXYCODONE HYDROCHLORIDE 5 MILLIGRAM(S): 5 TABLET ORAL at 17:07

## 2019-03-22 RX ADMIN — Medication 975 MILLIGRAM(S): at 00:09

## 2019-03-22 RX ADMIN — OXYCODONE HYDROCHLORIDE 5 MILLIGRAM(S): 5 TABLET ORAL at 03:19

## 2019-03-22 RX ADMIN — Medication 325 MILLIGRAM(S): at 11:39

## 2019-03-22 RX ADMIN — Medication 975 MILLIGRAM(S): at 12:38

## 2019-03-22 RX ADMIN — Medication 975 MILLIGRAM(S): at 06:42

## 2019-03-22 RX ADMIN — Medication 100 MILLIGRAM(S): at 11:39

## 2019-03-22 RX ADMIN — Medication 1 TABLET(S): at 11:39

## 2019-03-22 RX ADMIN — Medication 600 MILLIGRAM(S): at 05:57

## 2019-03-22 RX ADMIN — Medication 975 MILLIGRAM(S): at 11:38

## 2019-03-22 RX ADMIN — Medication 600 MILLIGRAM(S): at 18:55

## 2019-03-22 RX ADMIN — Medication 975 MILLIGRAM(S): at 19:41

## 2019-03-22 RX ADMIN — Medication 975 MILLIGRAM(S): at 18:54

## 2019-03-22 NOTE — PROGRESS NOTE ADULT - SUBJECTIVE AND OBJECTIVE BOX
S: Patient doing well. Denies of s/s of hypovolemia.     O: Vital Signs Last 24 Hrs  T(C): 36.5 (22 Mar 2019 05:54), Max: 37.2 (21 Mar 2019 13:27)  T(F): 97.7 (22 Mar 2019 05:54), Max: 99 (21 Mar 2019 13:27)  HR: 86 (22 Mar 2019 05:54) (86 - 103)  BP: 116/72 (22 Mar 2019 05:54) (110/69 - 116/72)  BP(mean): --  RR: 17 (22 Mar 2019 05:54) (17 - 18)  SpO2: 100% (22 Mar 2019 05:54) (100% - 100%)    Gen: NAD  Abd: soft, NT, ND, fundus firm below umbilicus  Lochia: moderate  Incision: well approximated, clean, dry, intact, steri strips. old blood stained  Ext: no tenderness, edema present    Medications:  acetaminophen   Tablet .. 975 milliGRAM(s) Oral every 6 hours  diphenhydrAMINE 25 milliGRAM(s) Oral every 6 hours PRN  diphtheria/tetanus/pertussis (acellular) Vaccine (ADAcel) 0.5 milliLiter(s) IntraMuscular once  docusate sodium 100 milliGRAM(s) Oral two times a day PRN  ferrous    sulfate 325 milliGRAM(s) Oral daily  glycerin Suppository - Adult 1 Suppository(s) Rectal at bedtime PRN  heparin  Injectable 5000 Unit(s) SubCutaneous every 12 hours  ibuprofen  Tablet. 600 milliGRAM(s) Oral every 6 hours  lanolin Ointment 1 Application(s) Topical every 3 hours PRN  naloxone Injectable 0.1 milliGRAM(s) IV Push every 3 minutes PRN  ondansetron Injectable 4 milliGRAM(s) IV Push every 6 hours PRN  oxyCODONE    IR 5 milliGRAM(s) Oral every 4 hours PRN  oxyCODONE    IR 5 milliGRAM(s) Oral every 3 hours  prenatal multivitamin 1 Tablet(s) Oral daily  simethicone 80 milliGRAM(s) Chew every 4 hours PRN    Labs:                        8.6    16.28 )-----------( 202      ( 21 Mar 2019 06:15 )             27.0       A: 28y PPD#2 s/p C/S, no reports of concerns and doing well.     Plan: Continue routine postpartum care. Anticipate d/c home in the am. S: Patient doing well. Denies of s/s of hypovolemia.     O: Vital Signs Last 24 Hrs  T(C): 36.5 (22 Mar 2019 05:54), Max: 37.2 (21 Mar 2019 13:27)  T(F): 97.7 (22 Mar 2019 05:54), Max: 99 (21 Mar 2019 13:27)  HR: 86 (22 Mar 2019 05:54) (86 - 103)  BP: 116/72 (22 Mar 2019 05:54) (110/69 - 116/72)  BP(mean): --  RR: 17 (22 Mar 2019 05:54) (17 - 18)  SpO2: 100% (22 Mar 2019 05:54) (100% - 100%)    Gen: NAD  Abd: soft, NT, ND, fundus firm below umbilicus  Incision: well approximated, clean, dry, intact, steri strips. old blood stained  Ext: no tenderness, edema present    Medications:  acetaminophen   Tablet .. 975 milliGRAM(s) Oral every 6 hours  diphenhydrAMINE 25 milliGRAM(s) Oral every 6 hours PRN  diphtheria/tetanus/pertussis (acellular) Vaccine (ADAcel) 0.5 milliLiter(s) IntraMuscular once  docusate sodium 100 milliGRAM(s) Oral two times a day PRN  ferrous    sulfate 325 milliGRAM(s) Oral daily  glycerin Suppository - Adult 1 Suppository(s) Rectal at bedtime PRN  heparin  Injectable 5000 Unit(s) SubCutaneous every 12 hours  ibuprofen  Tablet. 600 milliGRAM(s) Oral every 6 hours  lanolin Ointment 1 Application(s) Topical every 3 hours PRN  naloxone Injectable 0.1 milliGRAM(s) IV Push every 3 minutes PRN  ondansetron Injectable 4 milliGRAM(s) IV Push every 6 hours PRN  oxyCODONE    IR 5 milliGRAM(s) Oral every 4 hours PRN  oxyCODONE    IR 5 milliGRAM(s) Oral every 3 hours  prenatal multivitamin 1 Tablet(s) Oral daily  simethicone 80 milliGRAM(s) Chew every 4 hours PRN    Labs:                        8.6    16.28 )-----------( 202      ( 21 Mar 2019 06:15 )             27.0       A: 28y PPD#2 s/p C/S, no reports of concerns and doing well.     Plan: Continue routine postpartum care. Anticipate d/c home in the am.

## 2019-03-22 NOTE — PROGRESS NOTE ADULT - ATTENDING COMMENTS
Pt seen and examined by me today. I agree with findings, assessment and plan documented in resident note.
saw and examined patient  doing well  tolreating PO

## 2019-03-23 VITALS
HEART RATE: 93 BPM | RESPIRATION RATE: 16 BRPM | TEMPERATURE: 98 F | DIASTOLIC BLOOD PRESSURE: 69 MMHG | SYSTOLIC BLOOD PRESSURE: 115 MMHG | OXYGEN SATURATION: 100 %

## 2019-03-23 RX ADMIN — Medication 600 MILLIGRAM(S): at 01:43

## 2019-03-23 RX ADMIN — Medication 975 MILLIGRAM(S): at 02:15

## 2019-03-23 RX ADMIN — Medication 600 MILLIGRAM(S): at 11:00

## 2019-03-23 RX ADMIN — Medication 600 MILLIGRAM(S): at 02:15

## 2019-03-23 RX ADMIN — HEPARIN SODIUM 5000 UNIT(S): 5000 INJECTION INTRAVENOUS; SUBCUTANEOUS at 06:30

## 2019-03-23 RX ADMIN — Medication 975 MILLIGRAM(S): at 01:43

## 2019-03-23 RX ADMIN — Medication 975 MILLIGRAM(S): at 11:00

## 2019-03-23 RX ADMIN — Medication 975 MILLIGRAM(S): at 10:36

## 2019-03-23 RX ADMIN — Medication 600 MILLIGRAM(S): at 10:37

## 2019-03-24 DIAGNOSIS — G89.18 OTHER ACUTE POSTPROCEDURAL PAIN: ICD-10-CM

## 2019-03-25 DIAGNOSIS — O35.8XX0 MATERNAL CARE FOR OTHER (SUSPECTED) FETAL ABNORMALITY AND DAMAGE, NOT APPLICABLE OR UNSPECIFIED: ICD-10-CM

## 2019-03-25 DIAGNOSIS — O34.93 MATERNAL CARE FOR ABNORMALITY OF PELVIC ORGAN, UNSPECIFIED, THIRD TRIMESTER: ICD-10-CM

## 2019-03-25 DIAGNOSIS — O40.3XX0 POLYHYDRAMNIOS, THIRD TRIMESTER, NOT APPLICABLE OR UNSPECIFIED: ICD-10-CM

## 2019-03-25 DIAGNOSIS — O24.410 GESTATIONAL DIABETES MELLITUS IN PREGNANCY, DIET CONTROLLED: ICD-10-CM

## 2019-03-26 ENCOUNTER — APPOINTMENT (OUTPATIENT)
Dept: OBGYN | Facility: CLINIC | Age: 29
End: 2019-03-26

## 2019-04-16 ENCOUNTER — APPOINTMENT (OUTPATIENT)
Dept: OBGYN | Facility: CLINIC | Age: 29
End: 2019-04-16
Payer: COMMERCIAL

## 2019-04-16 VITALS
SYSTOLIC BLOOD PRESSURE: 108 MMHG | HEART RATE: 82 BPM | BODY MASS INDEX: 31.91 KG/M2 | DIASTOLIC BLOOD PRESSURE: 76 MMHG | HEIGHT: 61 IN | WEIGHT: 169 LBS

## 2019-04-16 PROCEDURE — 0503F POSTPARTUM CARE VISIT: CPT

## 2019-04-16 RX ORDER — LANCETS 33 GAUGE
EACH MISCELLANEOUS
Qty: 1 | Refills: 2 | Status: DISCONTINUED | COMMUNITY
Start: 2019-01-22 | End: 2019-04-16

## 2019-04-16 RX ORDER — BLOOD SUGAR DIAGNOSTIC
STRIP MISCELLANEOUS 4 TIMES DAILY
Qty: 1 | Refills: 5 | Status: DISCONTINUED | COMMUNITY
Start: 2019-01-22 | End: 2019-04-16

## 2019-04-16 RX ORDER — OXYCODONE 5 MG/1
5 TABLET ORAL
Qty: 15 | Refills: 0 | Status: DISCONTINUED | COMMUNITY
Start: 2019-03-24 | End: 2019-04-16

## 2019-04-16 NOTE — HISTORY OF PRESENT ILLNESS
[Delivery Date: ___] : on [unfilled] [Female] : Delivery History: baby girl [Girl] : baby is a girl [Infant's Name ___] : [unfilled] [___ Lbs] : [unfilled] lbs [___ Oz] : [unfilled] oz [Living at Home] : is currently living at home [Breastfeeding] : breastfeeding [Bottle Feeding] : bottle feeding [Resumed [place]] : has resumed intercourse [Clean/Dry/Intact] : clean, dry and intact [Doing Well] : is doing well [No Sign of Infection] : is showing no signs of infection [Excellent Pain Control] : has excellent pain control [Intended Contraception] : the patient does not intended to use contraception postpartum [Resumed Menses] : has not resumed her menses [Swelling] : not swollen [Erythema] : not erythematous [FreeTextEntry8] : This 29 yo P1 presents, with Hx of polyhydramnios and GDMA1, post primary C/S due to failure to progress, concerned about incision- saw a string and was concerned, otherwise feels well at 4 weeks post-partum. [de-identified] : Healing incision [Dehiscence] : not dehisced [de-identified] : Reviewed care of incision, pt to RTO as scheduled for 6 week PPV

## 2019-04-25 ENCOUNTER — EMERGENCY (EMERGENCY)
Facility: HOSPITAL | Age: 29
LOS: 1 days | Discharge: HOME | End: 2019-04-25
Attending: EMERGENCY MEDICINE | Admitting: EMERGENCY MEDICINE
Payer: MEDICAID

## 2019-04-25 VITALS
TEMPERATURE: 100 F | HEART RATE: 97 BPM | SYSTOLIC BLOOD PRESSURE: 113 MMHG | RESPIRATION RATE: 18 BRPM | OXYGEN SATURATION: 100 % | DIASTOLIC BLOOD PRESSURE: 63 MMHG

## 2019-04-25 VITALS
OXYGEN SATURATION: 100 % | HEART RATE: 89 BPM | RESPIRATION RATE: 16 BRPM | SYSTOLIC BLOOD PRESSURE: 106 MMHG | TEMPERATURE: 99 F | DIASTOLIC BLOOD PRESSURE: 65 MMHG

## 2019-04-25 DIAGNOSIS — Z98.891 HISTORY OF UTERINE SCAR FROM PREVIOUS SURGERY: Chronic | ICD-10-CM

## 2019-04-25 DIAGNOSIS — Z98.89 OTHER SPECIFIED POSTPROCEDURAL STATES: Chronic | ICD-10-CM

## 2019-04-25 LAB
ALBUMIN SERPL ELPH-MCNC: 3.6 G/DL — SIGNIFICANT CHANGE UP (ref 3.3–5)
ALP SERPL-CCNC: 58 U/L — SIGNIFICANT CHANGE UP (ref 40–120)
ALT FLD-CCNC: 17 U/L — SIGNIFICANT CHANGE UP (ref 4–33)
ANION GAP SERPL CALC-SCNC: 16 MMO/L — HIGH (ref 7–14)
APPEARANCE UR: SIGNIFICANT CHANGE UP
AST SERPL-CCNC: 12 U/L — SIGNIFICANT CHANGE UP (ref 4–32)
B PERT DNA SPEC QL NAA+PROBE: NOT DETECTED — SIGNIFICANT CHANGE UP
BACTERIA # UR AUTO: SIGNIFICANT CHANGE UP
BASE EXCESS BLDV CALC-SCNC: -1.5 MMOL/L — SIGNIFICANT CHANGE UP
BASOPHILS # BLD AUTO: 0.04 K/UL — SIGNIFICANT CHANGE UP (ref 0–0.2)
BASOPHILS NFR BLD AUTO: 0.5 % — SIGNIFICANT CHANGE UP (ref 0–2)
BILIRUB SERPL-MCNC: 0.2 MG/DL — SIGNIFICANT CHANGE UP (ref 0.2–1.2)
BILIRUB UR-MCNC: NEGATIVE — SIGNIFICANT CHANGE UP
BLOOD GAS VENOUS - CREATININE: 0.69 MG/DL — SIGNIFICANT CHANGE UP (ref 0.5–1.3)
BLOOD UR QL VISUAL: HIGH
BUN SERPL-MCNC: 10 MG/DL — SIGNIFICANT CHANGE UP (ref 7–23)
C PNEUM DNA SPEC QL NAA+PROBE: NOT DETECTED — SIGNIFICANT CHANGE UP
CALCIUM SERPL-MCNC: 8.9 MG/DL — SIGNIFICANT CHANGE UP (ref 8.4–10.5)
CHLORIDE BLDV-SCNC: 107 MMOL/L — SIGNIFICANT CHANGE UP (ref 96–108)
CHLORIDE SERPL-SCNC: 101 MMOL/L — SIGNIFICANT CHANGE UP (ref 98–107)
CO2 SERPL-SCNC: 20 MMOL/L — LOW (ref 22–31)
COLOR SPEC: YELLOW — SIGNIFICANT CHANGE UP
CREAT SERPL-MCNC: 0.82 MG/DL — SIGNIFICANT CHANGE UP (ref 0.5–1.3)
EOSINOPHIL # BLD AUTO: 0.03 K/UL — SIGNIFICANT CHANGE UP (ref 0–0.5)
EOSINOPHIL NFR BLD AUTO: 0.4 % — SIGNIFICANT CHANGE UP (ref 0–6)
FLUAV H1 2009 PAND RNA SPEC QL NAA+PROBE: NOT DETECTED — SIGNIFICANT CHANGE UP
FLUAV H1 RNA SPEC QL NAA+PROBE: NOT DETECTED — SIGNIFICANT CHANGE UP
FLUAV H3 RNA SPEC QL NAA+PROBE: NOT DETECTED — SIGNIFICANT CHANGE UP
FLUAV SUBTYP SPEC NAA+PROBE: NOT DETECTED — SIGNIFICANT CHANGE UP
FLUBV RNA SPEC QL NAA+PROBE: NOT DETECTED — SIGNIFICANT CHANGE UP
GAS PNL BLDV: 134 MMOL/L — LOW (ref 136–146)
GLUCOSE BLDV-MCNC: 98 — SIGNIFICANT CHANGE UP (ref 70–99)
GLUCOSE SERPL-MCNC: 107 MG/DL — HIGH (ref 70–99)
GLUCOSE UR-MCNC: NEGATIVE — SIGNIFICANT CHANGE UP
HADV DNA SPEC QL NAA+PROBE: NOT DETECTED — SIGNIFICANT CHANGE UP
HCO3 BLDV-SCNC: 23 MMOL/L — SIGNIFICANT CHANGE UP (ref 20–27)
HCOV PNL SPEC NAA+PROBE: SIGNIFICANT CHANGE UP
HCT VFR BLD CALC: 35.5 % — SIGNIFICANT CHANGE UP (ref 34.5–45)
HCT VFR BLDV CALC: 34.4 % — LOW (ref 34.5–45)
HGB BLD-MCNC: 11 G/DL — LOW (ref 11.5–15.5)
HGB BLDV-MCNC: 11.1 G/DL — LOW (ref 11.5–15.5)
HMPV RNA SPEC QL NAA+PROBE: NOT DETECTED — SIGNIFICANT CHANGE UP
HPIV1 RNA SPEC QL NAA+PROBE: NOT DETECTED — SIGNIFICANT CHANGE UP
HPIV2 RNA SPEC QL NAA+PROBE: NOT DETECTED — SIGNIFICANT CHANGE UP
HPIV3 RNA SPEC QL NAA+PROBE: NOT DETECTED — SIGNIFICANT CHANGE UP
HPIV4 RNA SPEC QL NAA+PROBE: NOT DETECTED — SIGNIFICANT CHANGE UP
HYALINE CASTS # UR AUTO: NEGATIVE — SIGNIFICANT CHANGE UP
IMM GRANULOCYTES NFR BLD AUTO: 0.6 % — SIGNIFICANT CHANGE UP (ref 0–1.5)
KETONES UR-MCNC: NEGATIVE — SIGNIFICANT CHANGE UP
LACTATE BLDV-MCNC: 1.4 MMOL/L — SIGNIFICANT CHANGE UP (ref 0.5–2)
LEUKOCYTE ESTERASE UR-ACNC: SIGNIFICANT CHANGE UP
LYMPHOCYTES # BLD AUTO: 2.12 K/UL — SIGNIFICANT CHANGE UP (ref 1–3.3)
LYMPHOCYTES # BLD AUTO: 26.4 % — SIGNIFICANT CHANGE UP (ref 13–44)
MCHC RBC-ENTMCNC: 25.5 PG — LOW (ref 27–34)
MCHC RBC-ENTMCNC: 31 % — LOW (ref 32–36)
MCV RBC AUTO: 82.2 FL — SIGNIFICANT CHANGE UP (ref 80–100)
MONOCYTES # BLD AUTO: 0.69 K/UL — SIGNIFICANT CHANGE UP (ref 0–0.9)
MONOCYTES NFR BLD AUTO: 8.6 % — SIGNIFICANT CHANGE UP (ref 2–14)
NEUTROPHILS # BLD AUTO: 5.1 K/UL — SIGNIFICANT CHANGE UP (ref 1.8–7.4)
NEUTROPHILS NFR BLD AUTO: 63.5 % — SIGNIFICANT CHANGE UP (ref 43–77)
NITRITE UR-MCNC: POSITIVE — HIGH
NRBC # FLD: 0 K/UL — SIGNIFICANT CHANGE UP (ref 0–0)
PCO2 BLDV: 34 MMHG — LOW (ref 41–51)
PH BLDV: 7.43 PH — SIGNIFICANT CHANGE UP (ref 7.32–7.43)
PH UR: 7 — SIGNIFICANT CHANGE UP (ref 5–8)
PLATELET # BLD AUTO: 296 K/UL — SIGNIFICANT CHANGE UP (ref 150–400)
PMV BLD: 10.5 FL — SIGNIFICANT CHANGE UP (ref 7–13)
PO2 BLDV: 30 MMHG — LOW (ref 35–40)
POTASSIUM BLDV-SCNC: 3.9 MMOL/L — SIGNIFICANT CHANGE UP (ref 3.4–4.5)
POTASSIUM SERPL-MCNC: 3.9 MMOL/L — SIGNIFICANT CHANGE UP (ref 3.5–5.3)
POTASSIUM SERPL-SCNC: 3.9 MMOL/L — SIGNIFICANT CHANGE UP (ref 3.5–5.3)
PROT SERPL-MCNC: 6.9 G/DL — SIGNIFICANT CHANGE UP (ref 6–8.3)
PROT UR-MCNC: 30 — SIGNIFICANT CHANGE UP
RBC # BLD: 4.32 M/UL — SIGNIFICANT CHANGE UP (ref 3.8–5.2)
RBC # FLD: 14.5 % — SIGNIFICANT CHANGE UP (ref 10.3–14.5)
RBC CASTS # UR COMP ASSIST: >50 — HIGH (ref 0–?)
RSV RNA SPEC QL NAA+PROBE: NOT DETECTED — SIGNIFICANT CHANGE UP
RV+EV RNA SPEC QL NAA+PROBE: NOT DETECTED — SIGNIFICANT CHANGE UP
SAO2 % BLDV: 54.6 % — LOW (ref 60–85)
SODIUM SERPL-SCNC: 137 MMOL/L — SIGNIFICANT CHANGE UP (ref 135–145)
SP GR SPEC: 1.02 — SIGNIFICANT CHANGE UP (ref 1–1.04)
SQUAMOUS # UR AUTO: SIGNIFICANT CHANGE UP
UROBILINOGEN FLD QL: NORMAL — SIGNIFICANT CHANGE UP
WBC # BLD: 8.03 K/UL — SIGNIFICANT CHANGE UP (ref 3.8–10.5)
WBC # FLD AUTO: 8.03 K/UL — SIGNIFICANT CHANGE UP (ref 3.8–10.5)
WBC UR QL: HIGH (ref 0–?)

## 2019-04-25 PROCEDURE — 76700 US EXAM ABDOM COMPLETE: CPT | Mod: 26

## 2019-04-25 PROCEDURE — 99284 EMERGENCY DEPT VISIT MOD MDM: CPT

## 2019-04-25 PROCEDURE — 76830 TRANSVAGINAL US NON-OB: CPT | Mod: 26

## 2019-04-25 PROCEDURE — 71046 X-RAY EXAM CHEST 2 VIEWS: CPT | Mod: 26

## 2019-04-25 RX ORDER — CEFTRIAXONE 500 MG/1
1 INJECTION, POWDER, FOR SOLUTION INTRAMUSCULAR; INTRAVENOUS ONCE
Qty: 0 | Refills: 0 | Status: COMPLETED | OUTPATIENT
Start: 2019-04-25 | End: 2019-04-25

## 2019-04-25 RX ORDER — SODIUM CHLORIDE 9 MG/ML
1000 INJECTION INTRAMUSCULAR; INTRAVENOUS; SUBCUTANEOUS ONCE
Qty: 0 | Refills: 0 | Status: COMPLETED | OUTPATIENT
Start: 2019-04-25 | End: 2019-04-25

## 2019-04-25 RX ORDER — ACETAMINOPHEN 500 MG
650 TABLET ORAL ONCE
Qty: 0 | Refills: 0 | Status: COMPLETED | OUTPATIENT
Start: 2019-04-25 | End: 2019-04-25

## 2019-04-25 RX ADMIN — SODIUM CHLORIDE 1000 MILLILITER(S): 9 INJECTION INTRAMUSCULAR; INTRAVENOUS; SUBCUTANEOUS at 20:45

## 2019-04-25 RX ADMIN — Medication 650 MILLIGRAM(S): at 20:45

## 2019-04-25 RX ADMIN — CEFTRIAXONE 100 GRAM(S): 500 INJECTION, POWDER, FOR SOLUTION INTRAMUSCULAR; INTRAVENOUS at 22:02

## 2019-04-25 NOTE — ED ADULT NURSE NOTE - NSIMPLEMENTINTERV_GEN_ALL_ED
Implemented All Universal Safety Interventions:  [place] to call system. Call bell, personal items and telephone within reach. Instruct patient to call for assistance. Room bathroom lighting operational. Non-slip footwear when patient is off stretcher. Physically safe environment: no spills, clutter or unnecessary equipment. Stretcher in lowest position, wheels locked, appropriate side rails in place.

## 2019-04-25 NOTE — ED PROVIDER NOTE - CLINICAL SUMMARY MEDICAL DECISION MAKING FREE TEXT BOX
28 y/o female with a hx of gestational DM s/p  1 month ago presents to the ER c/o 3 days of fever, low back pain, body aches, fatigue, decreased appetite, pt is well appearing, NAD, +TTP at LLQ pain, will check labs, blood cultures, CXR, RVP, UA, Urine culture, IVF, reassess. 28 y/o female with a hx of gestational DM s/p  1 month ago presents to the ER c/o 3 days of fever, low back pain, body aches, fatigue, decreased appetite, pt is well appearing, NAD, +TTP at LLQ pain, will check labs, blood cultures, CXR, RVP, UA, US, Urine culture, IVF, reassess. 28 y/o female with a hx of gestational DM s/p  1 month ago presents to the ER c/o 3 days of fever, low back pain, body aches, fatigue, decreased appetite, pt is well appearing, NAD, +TTP at LLQ pain, will check labs, blood cultures, CXR, RVP, UA, US, Urine culture, IVF, reassess.  Assess for retained POC though less likely, and also endometritis (unlikely).  Possible UTI vs pyelo.

## 2019-04-25 NOTE — ED PROVIDER NOTE - ATTENDING CONTRIBUTION TO CARE
ED Attending (Dr Lance): I have personally performed a face to face bedside history and physical examination of this patient.  I have discussed the case with the PA and  I have personally authored the following components: HPI, ROS, Physical Exam and MDM in addition to reviewing and revising the rest of the Provider Note. 28 y/o female with a hx of gestational DM s/p  1 month ago presents to the ER c/o 3 days of fever, low back pain, body aches, fatigue, decreased appetite, pt is well appearing, NAD, +TTP at LLQ pain, will check labs, blood cultures, CXR, RVP, UA, US, Urine culture, IVF, reassess.  Assess for retained POC though less likely, and also endometritis (unlikely).  Possible UTI vs pyelo.

## 2019-04-25 NOTE — ED ADULT NURSE REASSESSMENT NOTE - NS ED NURSE REASSESS COMMENT FT1
pt received from US. pt fever improved, pt reports feeling slightly better. still c/o generalized "unwell" and fatigue. abx infusing per orders. awaiting radiology. will continue to monitor.

## 2019-04-25 NOTE — ED PROVIDER NOTE - PROGRESS NOTE DETAILS
RUPERT Cruz: pt feels better states symptoms improved; pt agreeable to CT abdomen with contrast; pt advised not to breast feed; pt states she is not currently breastfeeding.

## 2019-04-25 NOTE — ED PROVIDER NOTE - ABDOMINAL TENDER
left lower quadrant/mild TTP LLQ no rebounding no guarding, incision site well healing, no redness, no discharge, no TTP.

## 2019-04-25 NOTE — ED ADULT NURSE NOTE - OBJECTIVE STATEMENT
pt received alert and oriented x3. pt c.o having 3 days of fevers and lower back pain. pt is 1 month s/p c section. incision site is dry and intact. pt declines any chest pain , sob,n/v/d. 20 g placed in left a/c. labs drawn and sent. Call bell in reach, warm blanket provided, bed in lowest position, side rails up x2,safety maintained. will continue to monitor.. md at bedside for eval.

## 2019-04-26 LAB
SPECIMEN SOURCE: SIGNIFICANT CHANGE UP
SPECIMEN SOURCE: SIGNIFICANT CHANGE UP

## 2019-04-26 PROCEDURE — 74177 CT ABD & PELVIS W/CONTRAST: CPT | Mod: 26

## 2019-04-26 RX ORDER — CEPHALEXIN 500 MG
1 CAPSULE ORAL
Qty: 30 | Refills: 0 | OUTPATIENT
Start: 2019-04-26 | End: 2019-05-05

## 2019-04-27 LAB — SPECIMEN SOURCE: SIGNIFICANT CHANGE UP

## 2019-04-28 LAB
-  AMIKACIN: SIGNIFICANT CHANGE UP
-  AMPICILLIN/SULBACTAM: SIGNIFICANT CHANGE UP
-  AMPICILLIN: SIGNIFICANT CHANGE UP
-  AZTREONAM: SIGNIFICANT CHANGE UP
-  CEFAZOLIN: SIGNIFICANT CHANGE UP
-  CEFEPIME: SIGNIFICANT CHANGE UP
-  CEFOXITIN: SIGNIFICANT CHANGE UP
-  CEFTAZIDIME: SIGNIFICANT CHANGE UP
-  CEFTRIAXONE: SIGNIFICANT CHANGE UP
-  CIPROFLOXACIN: SIGNIFICANT CHANGE UP
-  ERTAPENEM: SIGNIFICANT CHANGE UP
-  GENTAMICIN: SIGNIFICANT CHANGE UP
-  IMIPENEM: SIGNIFICANT CHANGE UP
-  LEVOFLOXACIN: SIGNIFICANT CHANGE UP
-  MEROPENEM: SIGNIFICANT CHANGE UP
-  NITROFURANTOIN: SIGNIFICANT CHANGE UP
-  PIPERACILLIN/TAZOBACTAM: SIGNIFICANT CHANGE UP
-  TIGECYCLINE: SIGNIFICANT CHANGE UP
-  TOBRAMYCIN: SIGNIFICANT CHANGE UP
-  TRIMETHOPRIM/SULFAMETHOXAZOLE: SIGNIFICANT CHANGE UP
BACTERIA UR CULT: SIGNIFICANT CHANGE UP
METHOD TYPE: SIGNIFICANT CHANGE UP
ORGANISM # SPEC MICROSCOPIC CNT: SIGNIFICANT CHANGE UP
ORGANISM # SPEC MICROSCOPIC CNT: SIGNIFICANT CHANGE UP

## 2019-04-30 LAB
BACTERIA BLD CULT: SIGNIFICANT CHANGE UP
BACTERIA BLD CULT: SIGNIFICANT CHANGE UP

## 2019-05-06 ENCOUNTER — APPOINTMENT (OUTPATIENT)
Dept: OBGYN | Facility: CLINIC | Age: 29
End: 2019-05-06

## 2019-06-07 PROBLEM — O24.419 GESTATIONAL DIABETES MELLITUS IN PREGNANCY, UNSPECIFIED CONTROL: Chronic | Status: ACTIVE | Noted: 2019-04-25

## 2019-06-13 ENCOUNTER — APPOINTMENT (OUTPATIENT)
Dept: OBGYN | Facility: CLINIC | Age: 29
End: 2019-06-13
Payer: MEDICAID

## 2019-06-13 VITALS
DIASTOLIC BLOOD PRESSURE: 80 MMHG | SYSTOLIC BLOOD PRESSURE: 116 MMHG | WEIGHT: 170 LBS | HEART RATE: 73 BPM | BODY MASS INDEX: 32.1 KG/M2 | HEIGHT: 61 IN

## 2019-06-13 LAB
HCG UR QL: NEGATIVE
QUALITY CONTROL: YES

## 2019-06-13 PROCEDURE — 0503F POSTPARTUM CARE VISIT: CPT

## 2019-06-13 NOTE — HISTORY OF PRESENT ILLNESS
[Postpartum Follow Up] : postpartum follow up [Complications:___] : complications include: [unfilled] [Gestational Diabetes] : gestational diabetes [Female] : Delivery History: baby girl [Primary C/S] : delivered by  section [Delivery Date: ___] : on [unfilled] [ Section] :  section [Diabetes in Pregnancy] : diabetes in pregnancy [Delivery Date Was ___] : delivery date was [unfilled] [Pertussis Vaccine] : Pertussis vaccine administered [Girl] : baby is a girl [___ Oz] : [unfilled] oz [Infant's Name ___] : [unfilled] [___ Lbs] : [unfilled] lbs [Living at Home] : is currently living at home [Bottle Feeding] : bottle feeding [Resumed [employer]] : has resumed intercourse [Last Pap Date: ___] : Last Pap Date: [unfilled] [Intended Contraception] : Intended Contraception: [Condoms] : condoms [Clean/Dry/Intact] : clean, dry and intact [Back to Normal] : is back to normal in size [Mild] : mild vaginal bleeding [Normal] : the vagina was normal [Not Done] : Examination of breasts not done [Doing Well] : is doing well [No Sign of Infection] : is showing no signs of infection [Excellent Pain Control] : has excellent pain control [Rhogam] : Rhogam was not administered [Rubella Vaccine] : Rubella vaccine was not administered [BTL] : no tubal ligation [Breastfeeding] : not currently nursing [BF with Difficulty] : nursing without difficulty [Resumed Menses] : has not resumed her menses [S/Sx PP Depression] : no signs/symptoms of postpartum depression [None] : No associated symptoms are reported [Erythema] : not erythematous [Cervix Sample Taken] : cervical sample not taken for a Pap smear

## 2019-09-10 ENCOUNTER — CLINICAL ADVICE (OUTPATIENT)
Age: 29
End: 2019-09-10

## 2019-09-25 ENCOUNTER — APPOINTMENT (OUTPATIENT)
Dept: OBGYN | Facility: CLINIC | Age: 29
End: 2019-09-25
Payer: MEDICAID

## 2019-09-26 ENCOUNTER — APPOINTMENT (OUTPATIENT)
Dept: OBGYN | Facility: CLINIC | Age: 29
End: 2019-09-26
Payer: MEDICAID

## 2019-09-26 ENCOUNTER — ASOB RESULT (OUTPATIENT)
Age: 29
End: 2019-09-26

## 2019-09-26 VITALS
DIASTOLIC BLOOD PRESSURE: 85 MMHG | HEIGHT: 61 IN | WEIGHT: 167 LBS | BODY MASS INDEX: 31.53 KG/M2 | HEART RATE: 69 BPM | SYSTOLIC BLOOD PRESSURE: 124 MMHG

## 2019-09-26 PROCEDURE — 99213 OFFICE O/P EST LOW 20 MIN: CPT

## 2019-09-26 PROCEDURE — 76830 TRANSVAGINAL US NON-OB: CPT

## 2019-09-26 PROCEDURE — 99213 OFFICE O/P EST LOW 20 MIN: CPT | Mod: 25

## 2019-09-26 NOTE — CHIEF COMPLAINT
[Follow Up] : follow up GYN visit [FreeTextEntry1] : Patient got first menses in 8/28 since delivery and has been bleeding on and off. At times heavier than other days.\par Sono shows thickened endometrium, ? polyp\par UCG neg today. Had intercourse few days ago but no protection

## 2019-09-27 LAB
BASOPHILS # BLD AUTO: 0.05 K/UL
BASOPHILS NFR BLD AUTO: 0.7 %
EOSINOPHIL # BLD AUTO: 0.41 K/UL
EOSINOPHIL NFR BLD AUTO: 5.9 %
HCG SERPL-MCNC: <1 MIU/ML
HCT VFR BLD CALC: 36 %
HGB BLD-MCNC: 11.2 G/DL
IMM GRANULOCYTES NFR BLD AUTO: 0.1 %
LYMPHOCYTES # BLD AUTO: 3.37 K/UL
LYMPHOCYTES NFR BLD AUTO: 48.4 %
MAN DIFF?: NORMAL
MCHC RBC-ENTMCNC: 27.3 PG
MCHC RBC-ENTMCNC: 31.1 GM/DL
MCV RBC AUTO: 87.8 FL
MONOCYTES # BLD AUTO: 0.56 K/UL
MONOCYTES NFR BLD AUTO: 8 %
NEUTROPHILS # BLD AUTO: 2.56 K/UL
NEUTROPHILS NFR BLD AUTO: 36.9 %
PLATELET # BLD AUTO: 321 K/UL
RBC # BLD: 4.1 M/UL
RBC # FLD: 14.4 %
TSH SERPL-ACNC: 2.07 UIU/ML
WBC # FLD AUTO: 6.96 K/UL

## 2020-09-17 NOTE — ED ADULT NURSE NOTE - CINV DISCH MEDS REVIEWED YN
LUKAS      Pt CALLED REQUESTING TO SEE YOU FOR HER LEFT KNEE INSTEAD OF DR DEY     SHE STATES SHE WAS MADE TO SEE DR DEY BY WORK COMP AND THIS IS NO LONGER A WORK COMP ISSUE     PLEASE ADVISE   
TRIED TO REACH Pt @ [phone]  NO ANSWER LEFT VOICEMAIL   
n/a

## 2020-12-16 PROBLEM — Z86.19 HISTORY OF CANDIDAL VULVOVAGINITIS: Status: RESOLVED | Noted: 2018-10-23 | Resolved: 2020-12-16

## 2020-12-16 PROBLEM — N39.0 UTI (URINARY TRACT INFECTION), UNCOMPLICATED: Status: RESOLVED | Noted: 2018-09-06 | Resolved: 2020-12-16

## 2020-12-21 PROBLEM — Z87.09 HISTORY OF UPPER RESPIRATORY INFECTION: Status: RESOLVED | Noted: 2019-01-04 | Resolved: 2020-12-21

## 2021-06-27 ENCOUNTER — APPOINTMENT (OUTPATIENT)
Dept: OBGYN | Facility: CLINIC | Age: 31
End: 2021-06-27
Payer: MEDICAID

## 2021-06-27 VITALS
BODY MASS INDEX: 26.43 KG/M2 | DIASTOLIC BLOOD PRESSURE: 73 MMHG | HEART RATE: 84 BPM | TEMPERATURE: 97.5 F | WEIGHT: 140 LBS | HEIGHT: 61 IN | SYSTOLIC BLOOD PRESSURE: 107 MMHG

## 2021-06-27 DIAGNOSIS — Z01.419 ENCOUNTER FOR GYNECOLOGICAL EXAMINATION (GENERAL) (ROUTINE) W/OUT ABNORMAL FINDINGS: ICD-10-CM

## 2021-06-27 PROCEDURE — 99395 PREV VISIT EST AGE 18-39: CPT

## 2021-06-27 PROCEDURE — 99072 ADDL SUPL MATRL&STAF TM PHE: CPT

## 2021-06-27 RX ORDER — MEDROXYPROGESTERONE ACETATE 10 MG/1
10 TABLET ORAL DAILY
Qty: 14 | Refills: 1 | Status: DISCONTINUED | COMMUNITY
Start: 2019-09-26 | End: 2021-06-27

## 2021-06-27 NOTE — COUNSELING
[Nutrition/ Exercise/ Weight Management] : nutrition, exercise, weight management [Body Image] : body image [Vitamins/Supplements] : vitamins/supplements [Sunscreen] : sunscreen [Bladder Hygiene] : bladder hygiene [Contraception/ Emergency Contraception/ Safe Sexual Practices] : contraception, emergency contraception, safe sexual practices [STD (testing, results, tx)] : STD (testing, results, tx) [FreeTextEntry2] : seatbelt

## 2021-06-27 NOTE — PLAN
[FreeTextEntry1] : VANIA is a 31 year year old P1 presenting for well woman exam. Sexually active, monogamous with 1 male partner. \par Using nothing for contraception. \par No complaints. Reg menses.\par \par HCM\par pap/hpv\par \par \par Discussed all options for birth control including failure rates and risks. Discussed barrier methods such as condoms and diaphragms. discussed combined OCP, progesterone only pills, nuvaring, patch and depo provera. Discussed LARCs such as Mirena IUD, Paragard IUD and Nexplanon. Information brochure on all methods of birth control methods discussed given to patient \par \par wants to think about nexplanon

## 2021-06-27 NOTE — HISTORY OF PRESENT ILLNESS
[Previously active] : previously active [Men] : men [Vaginal] : vaginal [No] : No [FreeTextEntry1] : 30 y/o P1 LMP 6/3/21, reg here for well woman visit. no complaints\par interested in birth control

## 2021-06-28 LAB — HPV HIGH+LOW RISK DNA PNL CVX: NOT DETECTED

## 2021-06-30 LAB — CYTOLOGY CVX/VAG DOC THIN PREP: NORMAL

## 2021-11-17 ENCOUNTER — APPOINTMENT (OUTPATIENT)
Dept: OBGYN | Facility: CLINIC | Age: 31
End: 2021-11-17
Payer: MEDICAID

## 2021-11-17 VITALS
TEMPERATURE: 96.4 F | DIASTOLIC BLOOD PRESSURE: 71 MMHG | SYSTOLIC BLOOD PRESSURE: 106 MMHG | HEIGHT: 61 IN | HEART RATE: 80 BPM | BODY MASS INDEX: 25.02 KG/M2 | WEIGHT: 132.5 LBS

## 2021-11-17 DIAGNOSIS — R73.09 OTHER ABNORMAL GLUCOSE: ICD-10-CM

## 2021-11-17 DIAGNOSIS — Z87.42 PERSONAL HISTORY OF OTHER DISEASES OF THE FEMALE GENITAL TRACT: ICD-10-CM

## 2021-11-17 DIAGNOSIS — O24.410 GESTATIONAL DIABETES MELLITUS IN PREGNANCY, DIET CONTROLLED: ICD-10-CM

## 2021-11-17 DIAGNOSIS — Z34.00 ENCOUNTER FOR SUPERVISION OF NORMAL FIRST PREGNANCY, UNSPECIFIED TRIMESTER: ICD-10-CM

## 2021-11-17 DIAGNOSIS — R82.90 UNSPECIFIED ABNORMAL FINDINGS IN URINE: ICD-10-CM

## 2021-11-17 DIAGNOSIS — Z11.3 ENCOUNTER FOR SCREENING FOR INFECTIONS WITH A PREDOMINANTLY SEXUAL MODE OF TRANSMISSION: ICD-10-CM

## 2021-11-17 PROCEDURE — 99213 OFFICE O/P EST LOW 20 MIN: CPT

## 2021-11-17 RX ORDER — SULFAMETHOXAZOLE AND TRIMETHOPRIM 800; 160 MG/1; MG/1
800-160 TABLET ORAL TWICE DAILY
Qty: 6 | Refills: 0 | Status: ACTIVE | COMMUNITY
Start: 2021-11-17 | End: 1900-01-01

## 2021-11-17 NOTE — PHYSICAL EXAM
[Labia Majora] : normal [Labia Minora] : normal [No Bleeding] : There was no active vaginal bleeding [Normal] : normal [Tenderness] : nontender [Uterine Adnexae] : non-palpable

## 2021-11-19 LAB
C TRACH RRNA SPEC QL NAA+PROBE: NOT DETECTED
CANDIDA VAG CYTO: NOT DETECTED
G VAGINALIS+PREV SP MTYP VAG QL MICRO: NOT DETECTED
HBV SURFACE AG SER QL: NONREACTIVE
HCG SERPL-MCNC: <1 MIU/ML
HCV AB SER QL: NONREACTIVE
HCV S/CO RATIO: 0.08 S/CO
HIV1+2 AB SPEC QL IA.RAPID: NONREACTIVE
N GONORRHOEA RRNA SPEC QL NAA+PROBE: NOT DETECTED
SOURCE AMPLIFICATION: NORMAL
T PALLIDUM AB SER QL IA: NEGATIVE
T VAGINALIS VAG QL WET PREP: NOT DETECTED

## 2021-11-22 LAB — BACTERIA UR CULT: ABNORMAL

## 2023-02-06 NOTE — ED PROVIDER NOTE - MUSCULOSKELETAL [+], MLM
Pt alert and oriented. Respirations regular and easy. Pt scheduled for cardiology appointment tomorrow. Discharge instructions reviewed. States understanding. Pt discharged in satisfactory condition.      Argenis Lopez RN  02/06/23 [id number] BACK PAIN

## 2023-03-04 NOTE — ED PROVIDER NOTE - OBJECTIVE STATEMENT
28 y/o female with a hx of gestational DM s/p  1 month ago presents to the ER c/o 3 days of fever, low back pain, body aches, fatigue, decreased appetite. Pt denies chest pain, shortness of breath, nausea, cough, congestion, vomiting, abdominal pain, incision site pain.  Pt reports fever at home TMax 105. 3 = A little assistance 30 y/o female with a hx of gestational DM s/p  1 month ago presents to the ER c/o 3 days of fever, low back pain, body aches, fatigue, decreased appetite. Pt denies chest pain, shortness of breath, nausea, cough, congestion, vomiting, abdominal pain, incision site pain.  Pt reports fever at home TMax 105.  no recent travel.  no urinary copmlaints. NO vaginal beeding or dc.

## 2023-07-10 NOTE — ED PROVIDER NOTE - INTERPRETATION
used normal sinus rhythm, Normal axis, Normal IA interval and QRS complex. There are no acute ischemic ST or T-wave changes.

## 2023-09-22 NOTE — PROGRESS NOTE ADULT - SUBJECTIVE AND OBJECTIVE BOX
SUBJECTIVE:  Pain: well controlled  Complaints: none    MILESTONES:  Alert and oriented x 3  [x  ]  Out of bed/ ambulating. [x  ]  Flatus: [ x ]  Postive [  ] Negative   Bowel movement  [x  ] Positive [  ] Negative   Voiding [x  ] Due to void [  ]   Diet: Regular [ x ]  Clears [  ]  NPO [  ]  Infant feeding:  Breast [ x ]   Bottle [  ]  Both [  ]  Feeding related inssues and/or concerns:none      OBJECTIVE:  T(C): 36.8 (19 @ 05:44), Max: 36.8 (19 @ 05:44)  HR: 93 (19 @ 05:44) (84 - 98)  BP: 115/69 (19 @ 05:44) (114/80 - 122/69)  RR: 16 (19 @ 05:44) (16 - 18)  SpO2: 100% (19 @ 05:44) (100% - 100%)  Wt(kg): --  MEDICATIONS  (STANDING):  acetaminophen   Tablet .. 975 milliGRAM(s) Oral every 6 hours  diphtheria/tetanus/pertussis (acellular) Vaccine (ADAcel) 0.5 milliLiter(s) IntraMuscular once  ferrous    sulfate 325 milliGRAM(s) Oral daily  heparin  Injectable 5000 Unit(s) SubCutaneous every 12 hours  ibuprofen  Tablet. 600 milliGRAM(s) Oral every 6 hours  measles/mumps/rubella Vaccine 0.5 milliLiter(s) SubCutaneous once  oxyCODONE    IR 5 milliGRAM(s) Oral every 3 hours  prenatal multivitamin 1 Tablet(s) Oral daily    MEDICATIONS  (PRN):  diphenhydrAMINE 25 milliGRAM(s) Oral every 6 hours PRN Itching  docusate sodium 100 milliGRAM(s) Oral two times a day PRN Stool Softening  glycerin Suppository - Adult 1 Suppository(s) Rectal at bedtime PRN Constipation  lanolin Ointment 1 Application(s) Topical every 3 hours PRN Sore Nipples  naloxone Injectable 0.1 milliGRAM(s) IV Push every 3 minutes PRN For ANY of the following changes in patient status:  A. RR LESS THAN 10 breaths per minute, B. Oxygen saturation LESS THAN 90%, C. Sedation score of 6  ondansetron Injectable 4 milliGRAM(s) IV Push every 6 hours PRN Nausea  oxyCODONE    IR 5 milliGRAM(s) Oral every 4 hours PRN Severe Pain (7 - 10)  simethicone 80 milliGRAM(s) Chew every 4 hours PRN Gas    ASSESSMENT:  28y  y/o G 1  P1    PO Day#    3  EBL 1200 s/p SRR for increased lactate , hypotensive and tachycardia, Patient is stable now   Delivery: Primary [x  ]    Repeat [  ]                                       Indication of procedure: Arrest  Condition: Stable  Past Medical History significant for: HPI: none  Current Issues: none  Heart:      RRR                        Lungs: clear  Breasts:  Soft [ x ]   Engorged [  ]  Abdomen: Soft [x  ] , distended [  ] nontender [  ]   Bowel sounds :  Present [ x ]  Absent [  ]   Fundus firm [ x ]  Boggy [  ]  Abdominal incision: Clean, dry and intact [ x ]  Staples [  ] Steri Strips [ x ] Dermabond [  ] Sutures [  ]  Patient wearing abdominal binder for support.  Vaginal: Lochia:  Heavy [  ]  Moderate [  ]   Scant [ x ]  Extremities: Edema [ 2+ ] negative Pati's Sign [ x ] Nontender Karthik  [ x ] Positive pedal pulses [ x ]  Other relevant physical exam findings: none    PLAN:  Plan: Increase ambulation, analgesia PRN and pain medication protocol standing oxycodone, ibuprofen and acetaminophen.  Diet: Regular diet  Continue routine post-operative and postpartum care.   Discharge Planning [x  ] Waleska Marino)

## 2025-02-01 NOTE — ED ADULT NURSE NOTE - PRO INTERPRETER NEED 2
No eating / drinking for 3 hours before going to bed.  Elevate head of bed 30 - 45 %     CPAP HABITUATION PROCEDURE     Spike Kaur, Ph.D., [facility] and Robert Kearns M.D.  [facility], [facility]     Some people have difficulty adjusting to CPAP/BiPAP/AutoCPAP.  This is not unusual or hard to understand: Breathing with CPAP is different from ordinary breathing, and this difference is aversive to some. The problem can be overcome, however, and the benefits CPAP confers are certainly worth the effort.  Below, you will find a simple and gradual way to get used to CPAP before you try to use it all night, every night.  The essence of this procedure is to relax and let breathing with CPAP become a habit.  It may take about 2 weeks, and involves the following:      CPAP while awake and comfortably seated, during the late evening.     CPAP in bed while attempting sleep at night.     If your discomfort is too great at any time, discontinue and attempt again later the same night, for the same amount of time.   You and your physician may alter the times and pressures if necessary.     If you find that it is very easy to get used to CPAP, you may start using it every night when you are comfortable enough to do so.  IMPORTANT REMINDER: If you have a cold or sinus congestion it is okay to miss a night or two of CPAP. Consider using antihistamines or decongestants to clear up your sinus congestion prior to sleeping.     DAYS  1-3   Start CPAP while awake and comfortably seated during the late evening, after having prepared for bed.  You may do this while watching television, listening to music or reading. Use for 1 hour, then take off CPAP and go directly to bed to sleep     DAYS  4-6     Start CPAP when you go to bed and use for 1 hour, or until you fall asleep.  If your discomfort is too great at any time, discontinue and attempt again later the same night, for the same designated  amount of time (1 hour).      DAYS  7-9     Increase time with CPAP to 2 hours a night.  If your discomfort is too great at any time, discontinue and attempt again later the same night, for the same designated amount of time (2 hours).      DAYS 10-12    Increase time with CPAP to 3 hours a night. If your discomfort is too great at any time, discontinue and attempt again later the same night, for the same designated amount of time (3 hours).      DAYS 13-15     Sleep the entire night with CPAP.      OPTIONAL: You may use Progressive Muscle Relaxation (PMR) to help put you at ease when using CPAP; do PMR twice each day, once in the morning or afternoon, and once in the evening just before using CPAP. You may do PMR prior to any attempt until you are comfortable with CPAP.        Continuous Positive Air Pressure (CPAP)  Continuous positive air pressure (CPAP) uses gentle air pressure to hold the airway open. CPAP is often the most effective treatment for sleep apnea and severe snoring. It works very well for many people. But keep in mind that it can take several adjustments before the setup is right for you.       How CPAP Works  CPAP is a small portable pump beside the bed. The pump sends air through a hose, which is held over your nose and/or mouth by a mask. Mild air pressure is gently pushed through your airway. The air pressure nudges sagging tissues aside. This widens the airway so you can breathe better. CPAP may be combined with other kinds of therapy for sleep apnea.       Types of Air Pressure Treatments  There are different types of CPAP. Your doctor or CPAP technician will help you decide which type is best for you:  Basic CPAP keeps the pressure constant all night long.  A bilevel device (BiPAP) provides more pressure when you breathe in and less when you breathe out. A BiPAP machine also may be set to provide automatic breaths to maintain breathing if you stop breathing while sleeping.  An autoCPAP  device automatically adjusts pressure throughout the night and in response to changes such as body position, sleep stage, and snoring.  © 3674-7905 [employer]. [address] [place] [address]. All rights reserved. This information is not intended as a substitute for professional medical care. Always follow your healthcare professional's instructions.        Snoring and Sleep Apnea: Notes for a Partner  Snoring and sleep apnea affect your life, as well as your partners. You can help in the treatment of the problem. Be supportive. Encourage your partner both to get treatment and to make the adjustments needed to follow through.       Adjusting to Changes  Your partners treatment may involve making changes to certain life habits. You can help your partner make and stick with these changes. For example:  Support and even join your partners exercise program.  Be supportive if your partner gets CPAP (continuous positive airway pressure). He or she may feel self-conscious at first. Remind your partner to expect adjustments to CPAP before it feels just right.  Consider joining a snoring and sleep apnea support group.  Go Along to See the Health Care Provider  You can give the health care provider the best account of your partners nighttime breathing and snoring patterns. Try to go along to health care providers appointments. If you cant go, write notes for your partner to give to the health care provider. Describe your partners snoring and sleep breathing patterns in detail.  Tips for Sleeping with a Snorer  Until treatment takes care of your partners snoring:  Try to go to bed first. It may help if youre already asleep when your partner starts to snore.  Wear earplugs to bed. A fan or other source of background noise may also help drown out snoring.   © 0110-5924 [employer]. [address] [place] [address]. All rights reserved. This information is not intended as a  substitute for professional medical care. Always follow your healthcare professional's instructions.        Continuous Positive Airway Pressure (CPAP)  Your health care provider has prescribed continuous positive airway pressure (CPAP) therapy for you. A CPAP device helps you breathe better at night. The device delivers air through your nose or mouth when you breathe in to keep your air passages open. CPAP is:  Used most often to treat sleep apnea and some other problems (Sleep apnea is a chronic condition with periods of sleep in which you briefly stop breathing.)  Safe and very effective, but it takes time to get used to the mask.   Your health care provider, nurse, or medical supplier will give you tips for wearing and caring for your CPAP device.  General guidelines  It's very important not to give up! It takes time to get used to wearing the mask at night.  Practice using your CPAP device during the day, especially whenever you take a nap.  Remember, there are several different types of masks. If you cant get used to your mask, ask your provider or medical supply company about trying another style.  If you have nasal stuffiness or dryness when using your CPAP device, talk with your provider or medical supply company. There are ways to lessen these problems. For example, your provider may recommend moistening nasal spray or the medical supply company may recommend a device with a humidifier.  The goal is to use your CPAP all night, every night, during all naps, and even when you travel.  Keep your mask clean. Wash it with soap and water. Be sure to rinse the mask and tubing well with water to remove any soap. Let them air-dry thoroughly before using.  Make yourself comfortable when sleeping with CPAP. Try using extra pillows.  Work with your medical supply company so that you know how to correctly use your CPAP. Their representative will be able to help you:  Use the CPAP correctly  Troubleshoot any problems  that come up  Learn to clean and maintain the device  Adjust to regular use of the CPAP  © 1736-5377 [employer] [place]. [address] [place] [address], [place] [address]. All rights reserved. This information is not intended as a substitute for professional medical care. Always follow your healthcare professional's instructions.          English

## 2025-04-04 NOTE — CHART NOTE - NSCHARTNOTESELECT_GEN_ALL_CORE
Outreach attempts to coordinate scheduling on the patient's Service to cardiac rehab order in workqueue [id number] requested on 3/20/2025 have been conducted.  Spoke with the patient on 3/27. Was informed that they would call to schedule.   Please contact Shan if further coordination is needed.   
Patient has an appointment today with Opal HODGSON   
Event Note
Rapid Response